# Patient Record
Sex: FEMALE | Race: WHITE | Employment: FULL TIME | URBAN - NONMETROPOLITAN AREA
[De-identification: names, ages, dates, MRNs, and addresses within clinical notes are randomized per-mention and may not be internally consistent; named-entity substitution may affect disease eponyms.]

---

## 2020-08-20 ENCOUNTER — OFFICE VISIT (OUTPATIENT)
Dept: SURGERY | Age: 30
End: 2020-08-20
Payer: COMMERCIAL

## 2020-08-20 VITALS — DIASTOLIC BLOOD PRESSURE: 85 MMHG | OXYGEN SATURATION: 97 % | HEART RATE: 104 BPM | SYSTOLIC BLOOD PRESSURE: 147 MMHG

## 2020-08-20 PROCEDURE — 99203 OFFICE O/P NEW LOW 30 MIN: CPT | Performed by: SURGERY

## 2020-08-20 RX ORDER — IBUPROFEN 800 MG/1
800 TABLET ORAL EVERY 8 HOURS PRN
COMMUNITY

## 2020-08-20 RX ORDER — ACETAMINOPHEN 650 MG/1
650 SUPPOSITORY RECTAL EVERY 4 HOURS PRN
COMMUNITY

## 2020-08-20 NOTE — LETTER
[address] [facility] Surg  [address]  Phone: [phone]  Fax: [phone]      20    Patient: Dayan Kim  MRN: [id number]  : 1990  Date of visit: 2020    Dear Dr Natasha Harvey: Thank you for the request for consultation for Cheryl Cardozo to me for the evaluation of RUQ pain. Below are the relevant portions of my assessment and plan of care. If you have questions, please do not hesitate to call me. I look forward to following Cheryl Cardozo along with you.     Sincerely,        Roseanne Molina, DO

## 2020-08-20 NOTE — PROGRESS NOTES
needed for Pain       No current facility-administered medications for this visit. Home Medications:   Prior to Admission medications    Medication Sig Start Date End Date Taking? Authorizing Provider   acetaminophen (TYLENOL) 650 MG suppository Place 650 mg rectally every 4 hours as needed for Fever   Yes Historical Provider, MD   ibuprofen (ADVIL;MOTRIN) 800 MG tablet Take 800 mg by mouth every 8 hours as needed for Pain   Yes Historical Provider, MD       Allergies  Latex      Review of Systems:  General: Denies any fever, chills. Eyes: Denies any changes in vision, diplopia or eye pain  Ears, Nose, Mouth: Denies changes in hearing/tinnitus or drainage from ears, no rhinorrhea or bloody nose, no difficulty chewing  Throat: no difficulty swallowing, no throat pain  Respiratory: Denies any shortness of breath or cough. Cardiac: Denies any chest pain, palpitations, claudication or edema. Gastrointestinal: RUQ pain Denies any melena, hematochezia, hematemesis or pyrosis. Genitourinary: Denies any frequency, urgency, hesitancy or incontinence. Musculoskeletal: Denies worsening muscle weakness or recent trauma  Skin: Denies rashes or lesions  Psychiatric: Denies any recent changes in mood or affect  Hematologic: Denies bruising or bleeding easily. PHYSICAL EXAMINATION  Vitals:   Vitals:    08/20/20 1112   BP: (!) 147/85   Pulse: 104   SpO2: 97%       General Appearance:  awake, alert, no acute distress, well developed, well nourished   Skin:  Skin color, texture, turgor normal. No rashes or lesions. Head/face:  NCAT, face symmetrical  Eyes:  PERRL, no evidence of conjunctivitis or ptosis bilaterally  Ears:  External ears and canals grossly normal, no evidence of otorrhea. Nose/Sinuses:  Nares normal. Septum midline. Mucosa normal. No external drainage noted. Mouth/Neck:  Mucosa moist.  No external oral lesions. Trachea midline. No visible masses.    Lungs:  Normal chest expansion, unlabored breathing

## 2020-09-11 LAB
ANION GAP SERPL CALCULATED.3IONS-SCNC: 9.1 MMOL/L
BUN BLDV-MCNC: 13 MG/DL (ref 7–17)
CALCIUM SERPL-MCNC: 9.7 MG/DL (ref 8.4–10.2)
CHLORIDE BLD-SCNC: 103 MMOL/L (ref 98–120)
CO2: 28 MMOL/L (ref 22–31)
CREAT SERPL-MCNC: 0.9 MG/DL (ref 0.5–1)
GFR CALCULATED: > 60
GLUCOSE: 111 MG/DL (ref 65–105)
HCT VFR BLD CALC: 43.6 % (ref 37–47)
HEMOGLOBIN: 14.8 (ref 12–16)
MCH RBC QN AUTO: 31.2 PG (ref 28.5–32.5)
MCHC RBC AUTO-ENTMCNC: 33.8 G/DL (ref 32–37)
MCV RBC AUTO: 92.2 FL (ref 80–94)
PDW BLD-RTO: 10.3 % (ref 8.5–15.5)
PLATELET # BLD: 232.7 THOU/MM3 (ref 130–400)
POTASSIUM SERPL-SCNC: 4.3 MMOL/L (ref 3.6–5)
RBC: 4.73 M/UL (ref 4.2–5.4)
SODIUM BLD-SCNC: 139 MMOL/L (ref 135–145)
WBC: 5.6 THOU/ML3 (ref 4.8–10.8)

## 2020-09-21 LAB
ANION GAP SERPL CALCULATED.3IONS-SCNC: 11.7 MMOL/L
BUN BLDV-MCNC: 10 MG/DL (ref 7–17)
CALCIUM SERPL-MCNC: 9.4 MG/DL (ref 8.4–10.2)
CHLORIDE BLD-SCNC: 103 MMOL/L (ref 98–120)
CO2: 23 MMOL/L (ref 22–31)
CREAT SERPL-MCNC: 0.9 MG/DL (ref 0.5–1)
GFR CALCULATED: > 60
GLUCOSE: 122 MG/DL (ref 65–105)
HCT VFR BLD CALC: 44.4 % (ref 37–47)
HEMOGLOBIN: 15.2 (ref 12–16)
MCH RBC QN AUTO: 31.2 PG (ref 28.5–32.5)
MCHC RBC AUTO-ENTMCNC: 34.3 G/DL (ref 32–37)
MCV RBC AUTO: 91.2 FL (ref 80–94)
PDW BLD-RTO: 10 % (ref 8.5–15.5)
PLATELET # BLD: 233.9 THOU/MM3 (ref 130–400)
POTASSIUM SERPL-SCNC: 4 MMOL/L (ref 3.6–5)
RBC: 4.87 M/UL (ref 4.2–5.4)
SODIUM BLD-SCNC: 139 MMOL/L (ref 135–145)
WBC: 5.4 THOU/ML3 (ref 4.8–10.8)

## 2020-09-25 ENCOUNTER — HOSPITAL ENCOUNTER (EMERGENCY)
Age: 30
Discharge: HOME | End: 2020-09-25
Attending: EMERGENCY MEDICINE
Payer: COMMERCIAL

## 2020-09-25 ENCOUNTER — APPOINTMENT (OUTPATIENT)
Dept: CT IMAGING | Age: 30
End: 2020-09-25
Payer: COMMERCIAL

## 2020-09-25 VITALS
RESPIRATION RATE: 14 BRPM | DIASTOLIC BLOOD PRESSURE: 85 MMHG | HEART RATE: 80 BPM | WEIGHT: 120 LBS | SYSTOLIC BLOOD PRESSURE: 115 MMHG | OXYGEN SATURATION: 100 % | TEMPERATURE: 97.7 F

## 2020-09-25 LAB
ABSOLUTE EOS #: <0.03 K/UL (ref 0–0.44)
ABSOLUTE IMMATURE GRANULOCYTE: 0.05 K/UL (ref 0–0.3)
ABSOLUTE LYMPH #: 1.03 K/UL (ref 1.1–3.7)
ABSOLUTE MONO #: 0.78 K/UL (ref 0.1–1.2)
ALBUMIN SERPL-MCNC: 4.6 G/DL (ref 3.5–5.2)
ALBUMIN/GLOBULIN RATIO: 1.7 (ref 1–2.5)
ALP BLD-CCNC: 66 U/L (ref 35–104)
ALT SERPL-CCNC: 218 U/L (ref 5–33)
ANION GAP SERPL CALCULATED.3IONS-SCNC: 12 MMOL/L (ref 9–17)
AST SERPL-CCNC: 224 U/L
BASOPHILS # BLD: 0 % (ref 0–2)
BASOPHILS ABSOLUTE: 0.03 K/UL (ref 0–0.2)
BILIRUB SERPL-MCNC: 0.83 MG/DL (ref 0.3–1.2)
BILIRUBIN DIRECT: 0.17 MG/DL
BILIRUBIN, INDIRECT: 0.66 MG/DL (ref 0–1)
BUN BLDV-MCNC: 9 MG/DL (ref 6–20)
BUN/CREAT BLD: 13 (ref 9–20)
CALCIUM SERPL-MCNC: 9.4 MG/DL (ref 8.6–10.4)
CHLORIDE BLD-SCNC: 102 MMOL/L (ref 98–107)
CO2: 21 MMOL/L (ref 20–31)
CREAT SERPL-MCNC: 0.71 MG/DL (ref 0.5–0.9)
DIFFERENTIAL TYPE: ABNORMAL
EOSINOPHILS RELATIVE PERCENT: 0 % (ref 1–4)
GFR AFRICAN AMERICAN: >60 ML/MIN
GFR NON-AFRICAN AMERICAN: >60 ML/MIN
GFR SERPL CREATININE-BSD FRML MDRD: ABNORMAL ML/MIN/{1.73_M2}
GFR SERPL CREATININE-BSD FRML MDRD: ABNORMAL ML/MIN/{1.73_M2}
GLOBULIN: 2.7 G/DL (ref 1.5–3.8)
GLUCOSE BLD-MCNC: 127 MG/DL (ref 70–99)
HCG QUALITATIVE: NEGATIVE
HCG URINE: NEGATIVE
HCT VFR BLD CALC: 39.1 % (ref 36.3–47.1)
HEMOGLOBIN: 13.3 G/DL (ref 11.9–15.1)
IMMATURE GRANULOCYTES: 0 %
LIPASE: 20 U/L (ref 13–60)
LYMPHOCYTES # BLD: 7 % (ref 24–43)
MCH RBC QN AUTO: 31.1 PG (ref 25.2–33.5)
MCHC RBC AUTO-ENTMCNC: 34 G/DL (ref 25.2–33.5)
MCV RBC AUTO: 91.6 FL (ref 82.6–102.9)
MONOCYTES # BLD: 6 % (ref 3–12)
NRBC AUTOMATED: 0 PER 100 WBC
PDW BLD-RTO: 12.1 % (ref 11.8–14.4)
PLATELET # BLD: 251 K/UL (ref 138–453)
PLATELET ESTIMATE: ABNORMAL
PMV BLD AUTO: 9.9 FL (ref 8.1–13.5)
POTASSIUM SERPL-SCNC: 4 MMOL/L (ref 3.7–5.3)
RBC # BLD: 4.27 M/UL (ref 3.95–5.11)
RBC # BLD: ABNORMAL 10*6/UL
SEG NEUTROPHILS: 87 % (ref 36–65)
SEGMENTED NEUTROPHILS ABSOLUTE COUNT: 11.94 K/UL (ref 1.5–8.1)
SODIUM BLD-SCNC: 135 MMOL/L (ref 135–144)
TOTAL PROTEIN: 7.3 G/DL (ref 6.4–8.3)
WBC # BLD: 13.8 K/UL (ref 3.5–11.3)
WBC # BLD: ABNORMAL 10*3/UL

## 2020-09-25 PROCEDURE — 96375 TX/PRO/DX INJ NEW DRUG ADDON: CPT

## 2020-09-25 PROCEDURE — 96374 THER/PROPH/DIAG INJ IV PUSH: CPT

## 2020-09-25 PROCEDURE — 6360000002 HC RX W HCPCS: Performed by: EMERGENCY MEDICINE

## 2020-09-25 PROCEDURE — 2580000003 HC RX 258: Performed by: EMERGENCY MEDICINE

## 2020-09-25 PROCEDURE — 80076 HEPATIC FUNCTION PANEL: CPT

## 2020-09-25 PROCEDURE — 6360000004 HC RX CONTRAST MEDICATION: Performed by: EMERGENCY MEDICINE

## 2020-09-25 PROCEDURE — 83690 ASSAY OF LIPASE: CPT

## 2020-09-25 PROCEDURE — 2709999900 CT ABDOMEN PELVIS W IV CONTRAST

## 2020-09-25 PROCEDURE — 36415 COLL VENOUS BLD VENIPUNCTURE: CPT

## 2020-09-25 PROCEDURE — 84703 CHORIONIC GONADOTROPIN ASSAY: CPT

## 2020-09-25 PROCEDURE — 80048 BASIC METABOLIC PNL TOTAL CA: CPT

## 2020-09-25 PROCEDURE — 99285 EMERGENCY DEPT VISIT HI MDM: CPT

## 2020-09-25 PROCEDURE — 85025 COMPLETE CBC W/AUTO DIFF WBC: CPT

## 2020-09-25 RX ORDER — KETOROLAC TROMETHAMINE 30 MG/ML
30 INJECTION, SOLUTION INTRAMUSCULAR; INTRAVENOUS ONCE
Status: COMPLETED | OUTPATIENT
Start: 2020-09-25 | End: 2020-09-25

## 2020-09-25 RX ORDER — KETOROLAC TROMETHAMINE 10 MG/1
10 TABLET, FILM COATED ORAL EVERY 6 HOURS PRN
Qty: 12 TABLET | Refills: 0 | Status: ON HOLD | OUTPATIENT
Start: 2020-09-25 | End: 2021-05-02 | Stop reason: HOSPADM

## 2020-09-25 RX ORDER — ONDANSETRON 4 MG/1
4 TABLET, ORALLY DISINTEGRATING ORAL EVERY 8 HOURS PRN
Qty: 10 TABLET | Refills: 0 | Status: SHIPPED | OUTPATIENT
Start: 2020-09-25

## 2020-09-25 RX ORDER — PROMETHAZINE HYDROCHLORIDE 25 MG/1
25 SUPPOSITORY RECTAL EVERY 6 HOURS PRN
Qty: 10 SUPPOSITORY | Refills: 0 | Status: SHIPPED | OUTPATIENT
Start: 2020-09-25 | End: 2020-10-02

## 2020-09-25 RX ORDER — 0.9 % SODIUM CHLORIDE 0.9 %
1000 INTRAVENOUS SOLUTION INTRAVENOUS ONCE
Status: COMPLETED | OUTPATIENT
Start: 2020-09-25 | End: 2020-09-25

## 2020-09-25 RX ORDER — ONDANSETRON 2 MG/ML
4 INJECTION INTRAMUSCULAR; INTRAVENOUS ONCE
Status: COMPLETED | OUTPATIENT
Start: 2020-09-25 | End: 2020-09-25

## 2020-09-25 RX ADMIN — ONDANSETRON 4 MG: 2 INJECTION INTRAMUSCULAR; INTRAVENOUS at 22:08

## 2020-09-25 RX ADMIN — SODIUM CHLORIDE 1000 ML: 9 INJECTION, SOLUTION INTRAVENOUS at 22:07

## 2020-09-25 RX ADMIN — KETOROLAC TROMETHAMINE 30 MG: 30 INJECTION, SOLUTION INTRAMUSCULAR at 22:09

## 2020-09-25 RX ADMIN — IOPAMIDOL 100 ML: 755 INJECTION, SOLUTION INTRAVENOUS at 22:46

## 2020-09-25 ASSESSMENT — PAIN DESCRIPTION - PAIN TYPE
TYPE: ACUTE PAIN
TYPE: ACUTE PAIN

## 2020-09-25 ASSESSMENT — PAIN DESCRIPTION - FREQUENCY
FREQUENCY: CONTINUOUS
FREQUENCY: CONTINUOUS

## 2020-09-25 ASSESSMENT — PAIN DESCRIPTION - LOCATION
LOCATION: ABDOMEN
LOCATION: ABDOMEN

## 2020-09-25 ASSESSMENT — PAIN SCALES - GENERAL
PAINLEVEL_OUTOF10: 10
PAINLEVEL_OUTOF10: 4
PAINLEVEL_OUTOF10: 10

## 2020-09-25 ASSESSMENT — PAIN DESCRIPTION - DESCRIPTORS: DESCRIPTORS: BURNING

## 2020-09-26 NOTE — ED TRIAGE NOTES
Patient presents to the ED with complaints of abdominal pain and emesis. Patient had her gallbladder removed earlier today per Dr. Anika Smith. She states about an hour after she got home she started throwing up despite taking Zofran. Patient has been taking 800 mg of motrin for pain. She states she is a recovering addict and does not want anything stronger for pain. No other questions or concerns at this time. Incisions intact. No signs of infections. Call light within reach.

## 2020-09-26 NOTE — ED PROVIDER NOTES
[address] [facility] ED  Osmin Fisher [address] [place]  Phone: [phone]      Pt Name: More Lau  TNU:[id number]  Baileygfpetey 1990  Date of evaluation: 9/25/2020      CHIEF COMPLAINT       Chief Complaint   Patient presents with    Post-op Problem     pain, green emisis        HISTORY OF PRESENT ILLNESS   More Lau is a 26 y.o. female who presents for evaluation of abdominal pain following a laparoscopic cholecystectomy. The patient reports that this morning she had a laparoscopic cholecystectomy at [facility] by Dr. Eloisa Adamson. She states that following the procedure her abdominal pain was manageable and she was discharged home. She states that starting around 2 PM this afternoon she developed gradual onset, constant, progressive, sharp, stabbing, throbbing, burning, nonradiating, epigastric abdominal pain with associated nausea and multiple episodes of nonbloody emesis. She also complains of feeling generally weak. She tried taking Zofran and ibuprofen without improvement. She does not list any other provoking or palliating factors. The patient has been passing flatus and states that she has had a bowel movement since surgery which was normal.  She is a recovered opioid addict and refuses any narcotics. She states that besides cholecystectomy the only other abdominal surgery she has had are hernia repair, tubal ligation and exploratory laparoscopy. The patient denies fever, chills, headache, vision changes, neck pain, back pain, chest pain, shortness of breath, urinary/bowel symptoms, vaginal bleeding, vaginal discharge, focal weakness, numbness, tingling, recent illness or any abdominal trauma since surgery.     REVIEW OF SYSTEMS     Ten point review of systems was reviewed and is negative unless otherwise noted in the HPI    [address]    has a past medical history of Abdominal bloating, Anxiety, Bacterial vaginitis, Chest pain, Colicky right upper quadrant pain, Dysmenorrhea, GERD (gastroesophageal reflux disease), H/O chlamydia infection, Nausea, Opiate addiction ([address] Osteoarthritis, and Pleurisy. SURGICAL HISTORY      has a past surgical history that includes laparoscopy and Tubal ligation. CURRENT MEDICATIONS       Previous Medications    ACETAMINOPHEN (TYLENOL) 650 MG SUPPOSITORY    Place 650 mg rectally every 4 hours as needed for Fever    IBUPROFEN (ADVIL;MOTRIN) 800 MG TABLET    Take 800 mg by mouth every 8 hours as needed for Pain       ALLERGIES     is allergic to latex. FAMILY HISTORY     She indicated that the status of her mother is unknown. She indicated that the status of her father is unknown. She indicated that the status of her other is unknown.     family history includes Cancer in an other family member; Coronary Art Dis in her father; High Blood Pressure in her father and mother; High Cholesterol in her father and mother; Other in her mother. SOCIAL HISTORY      reports that she has been smoking. She has never used smokeless tobacco.  I counseled the patient against using tobacco products. PHYSICAL EXAM     INITIAL VITALS:  weight is 120 lb (54.4 kg). Her tympanic temperature is 97.7 °F (36.5 °C). Her blood pressure is 115/85 and her pulse is 80. Her respiration is 14 and oxygen saturation is 100%. CONSTITUTIONAL: Appears uncomfortable, nontoxic  SKIN: warm, dry, no jaundice, hives or petechiae  EYES: clear conjunctiva, non-icteric sclera  HENT: normocephalic, atraumatic, dry mucus membranes  NECK: Nontender and supple with no nuchal rigidity, full range of motion  PULMONARY: clear to auscultation without wheezes, rhonchi, or rales, normal excursion, no accessory muscle use and no stridor  CARDIOVASCULAR: regular rate, rhythm. Strong radial pulses with intact distal perfusion. Capillary refill <2 seconds.   GASTROINTESTINAL: There are laparoscopic, well-healing, surgical incisions across abdomen that are closed with glue and do not show any signs of infection or dehiscence. Soft, epigastric tenderness to palpation, mild tenderness to the right upper quadrant with positive Jennings sign, no pain or McBurney's point, non-distended, no palpable masses, no rebound or guarding   GENITOURINARY: No costovertebral angle tenderness to palpation  MUSCULOSKELETAL: No midline spinal tenderness, step off or deformity. Extremities are otherwise nontender to palpation and nonerythematous. Compartments soft. No peripheral edema. NEUROLOGIC: alert and oriented x 3, GCS 15, normal mentation and speech. Moves all extremities x 4 without motor or sensory deficit, gait is stable without ataxia  PSYCHIATRIC: normal mood and affect, thought process is clear and linear    DIAGNOSTIC RESULTS     EKG:  None    RADIOLOGY:   Ct Abdomen Pelvis W Iv Contrast Additional Contrast? None    Result Date: 9/25/2020  EXAMINATION: CT OF THE ABDOMEN AND PELVIS WITH CONTRAST 9/25/2020 10:34 pm TECHNIQUE: CT of the abdomen and pelvis was performed with the administration of intravenous contrast. Multiplanar reformatted images are provided for review. Dose modulation, iterative reconstruction, and/or weight based adjustment of the mA/kV was utilized to reduce the radiation dose to as low as reasonably achievable. COMPARISON: None. HISTORY: ORDERING SYSTEM PROVIDED HISTORY: epigastric pain following lap gail this morning with N/V TECHNOLOGIST PROVIDED HISTORY: epigastric pain following lap gail this morning with N/V Reason for Exam: epigastric pain following lap gail this morning with nausea and vomiting Acuity: Acute Type of Exam: Initial FINDINGS: Lower Chest: Lung bases are clear without pulmonary nodules, masses, effusions, or foci of airspace disease. The base of the heart is normal in size without pericardial fluid collection. Organs: Status post cholecystectomy. Subtle pneumobilia noted and there is periportal edema.   No abnormal fluid collection identified within the %    Monocytes 6 3 - 12 %    Eosinophils % 0 (L) 1 - 4 %    Basophils 0 0 - 2 %    Immature Granulocytes 0 0 %    Segs Absolute 11.94 (H) 1.50 - 8.10 k/uL    Absolute Lymph # 1.03 (L) 1.10 - 3.70 k/uL    Absolute Mono # 0.78 0.10 - 1.20 k/uL    Absolute Eos # <0.03 0.00 - 0.44 k/uL    Basophils Absolute 0.03 0.00 - 0.20 k/uL    Absolute Immature Granulocyte 0.05 0.00 - 0.30 k/uL    WBC Morphology NOT REPORTED     RBC Morphology NOT REPORTED     Platelet Estimate NOT REPORTED    Basic Metabolic Panel w/ Reflex to MG   Result Value Ref Range    Glucose 127 (H) 70 - 99 mg/dL    BUN 9 6 - 20 mg/dL    CREATININE 0.71 0.50 - 0.90 mg/dL    Bun/Cre Ratio 13 9 - 20    Calcium 9.4 8.6 - 10.4 mg/dL    Sodium 135 135 - 144 mmol/L    Potassium 4.0 3.7 - 5.3 mmol/L    Chloride 102 98 - 107 mmol/L    CO2 21 20 - 31 mmol/L    Anion Gap 12 9 - 17 mmol/L    GFR Non-African American >60 >60 mL/min    GFR African American >60 >60 mL/min    GFR Comment          GFR Staging NOT REPORTED    Hepatic Function Panel   Result Value Ref Range    Alb 4.6 3.5 - 5.2 g/dL    Alkaline Phosphatase 66 35 - 104 U/L     (H) 5 - 33 U/L     (H) <32 U/L    Total Bilirubin 0.83 0.3 - 1.2 mg/dL    Bilirubin, Direct 0.17 <0.31 mg/dL    Bilirubin, Indirect 0.66 0.00 - 1.00 mg/dL    Total Protein 7.3 6.4 - 8.3 g/dL    Globulin 2.7 1.5 - 3.8 g/dL    Albumin/Globulin Ratio 1.7 1.0 - 2.5   Lipase   Result Value Ref Range    Lipase 20 13 - 60 U/L   HCG Qualitative, Serum   Result Value Ref Range    hCG Qual NEGATIVE NEGATIVE       EMERGENCY DEPARTMENT COURSE:     ED Course as of Sep 25 2328   Fri Sep 25, 2020   2310 I spoke to general surgery on call for Dr. Nakul Heaton, Dr. Hans Whitmore, and we reviewed the patient's results. We discussed that she is feeling improved after Toradol, Zofran and IV fluids.  Dr. Hans Whitmore suspects her symptoms are secondary to pos     [AM]      ED Course User Index  [AM] Sherron Wilson, DO       The patient was given the following medications:  Orders Placed This Encounter   Medications    0.9 % sodium chloride bolus    ketorolac (TORADOL) injection 30 mg    ondansetron (ZOFRAN) injection 4 mg    iopamidol (ISOVUE-370) 76 % injection 100 mL    ondansetron (ZOFRAN ODT) 4 MG disintegrating tablet     Sig: Take 1 tablet by mouth every 8 hours as needed for Nausea     Dispense:  10 tablet     Refill:  0    ketorolac (TORADOL) 10 MG tablet     Sig: Take 1 tablet by mouth every 6 hours as needed for Pain     Dispense:  12 tablet     Refill:  0    promethazine (PHENERGAN) 25 MG suppository     Sig: Place 1 suppository rectally every 6 hours as needed for Nausea WARNING:  May cause drowsiness. May impair ability to operate vehicles or machinery. Do not use in combination with alcohol. Dispense:  10 suppository     Refill:  0        Vitals:    Vitals:    09/25/20 2153 09/25/20 2256   BP: (!) 142/88 115/85   Pulse: 74 80   Resp: 14 14   Temp: 97.7 °F (36.5 °C)    TempSrc: Tympanic    SpO2: 97% 100%   Weight: 120 lb (54.4 kg)      -------------------------  BP: 115/85, Temp: 97.7 °F (36.5 °C), Pulse: 80, Resp: 14    CONSULTS:  None    CRITICAL CARE:   None    PROCEDURES:  None    DIAGNOSIS/ MDM:   More Lau is a 26 y.o. female who presents with abdominal pain, nausea and vomiting status post laparoscopic cholecystectomy this morning. Vital signs are stable. Heart regular rate and rhythm. Lungs clear to auscultation. Abdomen soft with epigastric tenderness to palpation. Surgical incisions appear to be intact and healing well. The patient was given IV fluids, Toradol and Zofran with significant improvement in her symptoms. Abdomen soft and minimally tender on repeat exam.  CBC shows a slight leukocytosis of 13.8 which I suspect is reactive secondary to her surgery this morning. CBC is otherwise unremarkable. BMP and lipase are unremarkable.   LFTs show elevated ALT of 218 and AST of 224 which is consistent with her surgery today.  CT abdomen pelvis shows postoperative changes without any abnormal fluid collection. I suspect the patient's pain is secondary to postoperative pain and she has not taken much for pain at home. I have low suspicion for acute abdomen, perforation, obstruction or sepsis. I spoke with the general surgeon on-call, Dr. Kiara Monge, who agrees with plan to discharge the patient home. He recommends prescribing her Toradol p.o. for pain. We also discussed prescribing her Zofran ODT and Phenergan suppositories to help control nausea. The patient was instructed to continue to hydrate at home with water and electrolyte rich fluids. She was instructed to follow-up with her surgeon next week as scheduled and to return to the ED for worsening symptoms or any other concern. The patient understands that at this time there is no evidence for a more malignant underlying process, but also understands that early in the process of an illness or injury, and emergency department work-up can be falsely reassuring. Routine discharge counseling was given, and the patient understands that worsening, changing or persistent symptoms should prompt a immediate call or follow-up with their primary care physician or return to the emergency department. The importance of appropriate follow-up was also discussed. I have reviewed the disposition diagnosis with the patient. I have answered their questions and given discharge instructions. They voiced understanding of these instructions and did not have any further questions or complaints. FINAL IMPRESSION      1. Post-operative pain    2. S/P laparoscopic cholecystectomy          DISPOSITION/PLAN   DISPOSITION          PATIENT REFERRED TO:  Benny Blackwell MD  [address]    Schedule an appointment as soon as possible for a visit in 3 days      Rome Coffey DO  [facility]  [phone]    Schedule an appointment as soon as possible for a visit in 3 days      [facility]  [address]  [phone]  Go to   If symptoms worsen      DISCHARGE MEDICATIONS:  New Prescriptions    KETOROLAC (TORADOL) 10 MG TABLET    Take 1 tablet by mouth every 6 hours as needed for Pain    ONDANSETRON (ZOFRAN ODT) 4 MG DISINTEGRATING TABLET    Take 1 tablet by mouth every 8 hours as needed for Nausea    PROMETHAZINE (PHENERGAN) 25 MG SUPPOSITORY    Place 1 suppository rectally every 6 hours as needed for Nausea WARNING:  May cause drowsiness. May impair ability to operate vehicles or machinery. Do not use in combination with alcohol.        (Please note that portions of this note were completed with a voice recognitionprogram.  Efforts were made to edit the dictations but occasionally words are mis-transcribed.)    Michael Grajeda DO  Emergency Physician Attending          Michael Grajeda DO  09/25/20 8074

## 2020-10-01 ENCOUNTER — OFFICE VISIT (OUTPATIENT)
Dept: SURGERY | Age: 30
End: 2020-10-01

## 2020-10-01 VITALS
TEMPERATURE: 99.3 F | DIASTOLIC BLOOD PRESSURE: 66 MMHG | SYSTOLIC BLOOD PRESSURE: 118 MMHG | BODY MASS INDEX: 19.23 KG/M2 | HEIGHT: 62 IN | WEIGHT: 104.5 LBS | HEART RATE: 74 BPM

## 2020-10-01 PROCEDURE — 99024 POSTOP FOLLOW-UP VISIT: CPT | Performed by: SURGERY

## 2020-10-01 NOTE — LETTER
[address]  Phone: [phone]  Fax: [phone]      10/1/20    Patient: Bessy Chapa  MRN: [id number]  : 1990  Date of visit: 10/1/2020    Dear Dr Sheila De La Rosa:     I saw Bessy Chapa in follow up to her lap cholecystectomy, she is doing well. Below are the relevant portions of my assessment and plan of care. If you have questions, please do not hesitate to call me. I look forward to following Bessy Chapa along with you.     Sincerely,        Mia Handy, DO

## 2020-10-01 NOTE — PROGRESS NOTES
[facility]  Progress Note    PATIENT NAME: [address] VISIT DATE: 10/1/2020    SUBJECTIVE:  Raz Carrington is a 26 y.o. female who presents to the clinic today for follow up to lap gail performed 9/25/20      OBJECTIVE:    /66   Pulse 74   Temp 99.3 °F (37.4 °C) (Tympanic)   Ht 5' 2\" (1.575 m)   Wt 104 lb 8 oz (47.4 kg)   BMI 19.11 kg/m²     General Appearance:  awake, alert, no acute distress, well developed, well nourished   Skin:  Skin color, texture, turgor normal. No rashes or lesions. Lungs:  Normal chest expansion, unlabored breathing without accessory muscle use. No audible rales, rhonchi, or wheezing. Cardiovascular: S1S2. No evidence of JVD. No evidence of pulsatile masses in abdomen  Abdomen:  Soft, non-tender, incisions well healed without evidence of bleeding/infection, resolving ecchymosis right abdomen port site. Skin glue is intact. Musculoskeletal: No evidence of bony/muscular deformities, trauma, atrophy of either left/right upper/lower extremity. No evidence of digital clubbing or cyanosis. ASSESSMENT:   Diagnosis Orders   1. Status post laparoscopic cholecystectomy         PLAN:  1. Diet advance as tolerated  2. Lifting restriction to less than 20lbs for the next 4 weeks, unrestricted after this. 3. Pt can remove skin glue after a few days if still intact  4.  F/u prn    Electronically signed by Idalia Ordoñez DO on 10/1/2020 at 9:57 AM

## 2020-10-22 ENCOUNTER — TELEPHONE (OUTPATIENT)
Dept: SURGERY | Age: 30
End: 2020-10-22

## 2020-10-22 NOTE — TELEPHONE ENCOUNTER
Patient phoned facility requesting a letter for work. Patient had a Lap/Jihan performed 09/25/2020. 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

## 2020-10-23 NOTE — TELEPHONE ENCOUNTER
She will be released from all lifting restrictions 10/30/2020, she is allowed to work as long as she maintains lifting/pushing/pulling to less than 20lbs.

## 2021-04-08 ENCOUNTER — INITIAL CONSULT (OUTPATIENT)
Dept: BARIATRICS/WEIGHT MGMT | Age: 31
End: 2021-04-08
Payer: COMMERCIAL

## 2021-04-08 VITALS
WEIGHT: 93 LBS | DIASTOLIC BLOOD PRESSURE: 72 MMHG | SYSTOLIC BLOOD PRESSURE: 116 MMHG | HEART RATE: 87 BPM | BODY MASS INDEX: 17.11 KG/M2 | HEIGHT: 62 IN

## 2021-04-08 DIAGNOSIS — K55.1: Primary | ICD-10-CM

## 2021-04-08 PROCEDURE — 99203 OFFICE O/P NEW LOW 30 MIN: CPT | Performed by: SURGERY

## 2021-04-08 RX ORDER — CYCLOBENZAPRINE HCL 10 MG
10 TABLET ORAL 3 TIMES DAILY PRN
COMMUNITY
End: 2021-04-29

## 2021-04-08 RX ORDER — PANTOPRAZOLE SODIUM 40 MG/1
40 TABLET, DELAYED RELEASE ORAL 2 TIMES DAILY
COMMUNITY

## 2021-04-08 RX ORDER — BUSPIRONE HYDROCHLORIDE 10 MG/1
10 TABLET ORAL 2 TIMES DAILY
COMMUNITY

## 2021-04-14 ENCOUNTER — HOSPITAL ENCOUNTER (OUTPATIENT)
Dept: LAB | Age: 31
Discharge: HOME | End: 2021-04-14
Payer: COMMERCIAL

## 2021-04-14 ENCOUNTER — HOSPITAL ENCOUNTER (OUTPATIENT)
Dept: CT IMAGING | Age: 31
Discharge: HOME | End: 2021-04-16
Payer: COMMERCIAL

## 2021-04-14 DIAGNOSIS — K55.1: ICD-10-CM

## 2021-04-14 LAB
ALBUMIN SERPL-MCNC: 4.9 G/DL (ref 3.5–5.2)
ALBUMIN/GLOBULIN RATIO: 2 (ref 1–2.5)
ALP BLD-CCNC: 52 U/L (ref 35–104)
ALT SERPL-CCNC: 11 U/L (ref 5–33)
ANION GAP SERPL CALCULATED.3IONS-SCNC: 11 MMOL/L (ref 9–17)
AST SERPL-CCNC: 17 U/L
BILIRUB SERPL-MCNC: 0.64 MG/DL (ref 0.3–1.2)
BUN BLDV-MCNC: 15 MG/DL (ref 6–20)
BUN/CREAT BLD: 21 (ref 9–20)
CALCIUM SERPL-MCNC: 9.5 MG/DL (ref 8.6–10.4)
CHLORIDE BLD-SCNC: 97 MMOL/L (ref 98–107)
CO2: 26 MMOL/L (ref 20–31)
CREAT SERPL-MCNC: 0.7 MG/DL (ref 0.5–0.9)
GFR AFRICAN AMERICAN: >60 ML/MIN
GFR NON-AFRICAN AMERICAN: >60 ML/MIN
GFR SERPL CREATININE-BSD FRML MDRD: ABNORMAL ML/MIN/{1.73_M2}
GFR SERPL CREATININE-BSD FRML MDRD: ABNORMAL ML/MIN/{1.73_M2}
GLUCOSE BLD-MCNC: 90 MG/DL (ref 70–99)
POTASSIUM SERPL-SCNC: 4 MMOL/L (ref 3.7–5.3)
PREALBUMIN: 25 MG/DL (ref 20–40)
SODIUM BLD-SCNC: 134 MMOL/L (ref 135–144)
TOTAL PROTEIN: 7.4 G/DL (ref 6.4–8.3)
TSH SERPL DL<=0.05 MIU/L-ACNC: 3.76 MIU/L (ref 0.3–5)

## 2021-04-14 PROCEDURE — 36415 COLL VENOUS BLD VENIPUNCTURE: CPT

## 2021-04-14 PROCEDURE — 6360000004 HC RX CONTRAST MEDICATION: Performed by: SURGERY

## 2021-04-14 PROCEDURE — 84443 ASSAY THYROID STIM HORMONE: CPT

## 2021-04-14 PROCEDURE — 84134 ASSAY OF PREALBUMIN: CPT

## 2021-04-14 PROCEDURE — 80053 COMPREHEN METABOLIC PANEL: CPT

## 2021-04-14 PROCEDURE — 2709999900 CT ABDOMEN PELVIS W IV CONTRAST

## 2021-04-14 RX ADMIN — IOHEXOL 50 ML: 240 INJECTION, SOLUTION INTRATHECAL; INTRAVASCULAR; INTRAVENOUS; ORAL at 13:44

## 2021-04-14 RX ADMIN — IOPAMIDOL 100 ML: 755 INJECTION, SOLUTION INTRAVENOUS at 14:07

## 2021-04-29 ENCOUNTER — OFFICE VISIT (OUTPATIENT)
Dept: BARIATRICS/WEIGHT MGMT | Age: 31
End: 2021-04-29
Payer: COMMERCIAL

## 2021-04-29 VITALS
HEIGHT: 62 IN | HEART RATE: 69 BPM | DIASTOLIC BLOOD PRESSURE: 60 MMHG | BODY MASS INDEX: 17.11 KG/M2 | WEIGHT: 93 LBS | SYSTOLIC BLOOD PRESSURE: 92 MMHG

## 2021-04-29 DIAGNOSIS — K55.1: Primary | ICD-10-CM

## 2021-04-29 PROCEDURE — 99212 OFFICE O/P EST SF 10 MIN: CPT | Performed by: SURGERY

## 2021-04-29 RX ORDER — CYCLOBENZAPRINE HCL 5 MG
TABLET ORAL
COMMUNITY
Start: 2021-03-30

## 2021-04-30 ENCOUNTER — TELEPHONE (OUTPATIENT)
Dept: BARIATRICS/WEIGHT MGMT | Age: 31
End: 2021-04-30

## 2021-04-30 NOTE — LETTER
[facility]  [address]  [address] [facility]  Phone: [phone]  Fax: [phone] [id number] , DO        April 30, 2021     Patient: Socorro Nuñez   YOB: 1990   Date of Visit: 4/30/2021       To Whom it May Concern:    Wade Hill was seen in my clinic on 4/29/2021. She was instructed to go to the hospital on 4/30/2021 for further evaluation. If you have any questions or concerns, please don't hesitate to call.     Sincerely,         Desi Daily, DO

## 2021-04-30 NOTE — TELEPHONE ENCOUNTER
Patient seen yesterday 4-29-21, employer is requesting a letter to state she was sent to hospital and that she was seen yesterday in the office, please fax to [phone] [employer]

## 2021-05-01 ENCOUNTER — HOSPITAL ENCOUNTER (OUTPATIENT)
Age: 31
Setting detail: OBSERVATION
Discharge: HOME | End: 2021-05-02
Attending: EMERGENCY MEDICINE | Admitting: INTERNAL MEDICINE
Payer: COMMERCIAL

## 2021-05-01 DIAGNOSIS — K55.1 SUPERIOR MESENTERIC ARTERY SYNDROME (HCC): Primary | ICD-10-CM

## 2021-05-01 PROBLEM — R62.51 FAILURE TO THRIVE (0-17): Status: ACTIVE | Noted: 2021-05-01

## 2021-05-01 PROBLEM — R62.7 FAILURE TO THRIVE IN ADULT: Status: ACTIVE | Noted: 2021-05-01

## 2021-05-01 LAB
ABSOLUTE EOS #: 0.1 K/UL (ref 0–0.44)
ABSOLUTE IMMATURE GRANULOCYTE: <0.03 K/UL (ref 0–0.3)
ABSOLUTE LYMPH #: 1.92 K/UL (ref 1.1–3.7)
ABSOLUTE MONO #: 0.77 K/UL (ref 0.1–1.2)
ALBUMIN SERPL-MCNC: 4.8 G/DL (ref 3.5–5.2)
ALBUMIN/GLOBULIN RATIO: 1.7 (ref 1–2.5)
ALP BLD-CCNC: 52 U/L (ref 35–104)
ALT SERPL-CCNC: 15 U/L (ref 5–33)
ANION GAP SERPL CALCULATED.3IONS-SCNC: 13 MMOL/L (ref 9–17)
AST SERPL-CCNC: 18 U/L
BASOPHILS # BLD: 1 % (ref 0–2)
BASOPHILS ABSOLUTE: 0.06 K/UL (ref 0–0.2)
BETA-HYDROXYBUTYRATE: 0.16 MMOL/L (ref 0.02–0.27)
BILIRUB SERPL-MCNC: 0.42 MG/DL (ref 0.3–1.2)
BUN BLDV-MCNC: 14 MG/DL (ref 6–20)
BUN/CREAT BLD: NORMAL (ref 9–20)
CALCIUM SERPL-MCNC: 9 MG/DL (ref 8.6–10.4)
CHLORIDE BLD-SCNC: 101 MMOL/L (ref 98–107)
CO2: 22 MMOL/L (ref 20–31)
CREAT SERPL-MCNC: 0.63 MG/DL (ref 0.5–0.9)
DIFFERENTIAL TYPE: NORMAL
EOSINOPHILS RELATIVE PERCENT: 1 % (ref 1–4)
GFR AFRICAN AMERICAN: >60 ML/MIN
GFR NON-AFRICAN AMERICAN: >60 ML/MIN
GFR SERPL CREATININE-BSD FRML MDRD: NORMAL ML/MIN/{1.73_M2}
GFR SERPL CREATININE-BSD FRML MDRD: NORMAL ML/MIN/{1.73_M2}
GLUCOSE BLD-MCNC: 88 MG/DL (ref 70–99)
HCG QUALITATIVE: NEGATIVE
HCT VFR BLD CALC: 43.7 % (ref 36.3–47.1)
HEMOGLOBIN: 14.6 G/DL (ref 11.9–15.1)
IMMATURE GRANULOCYTES: 0 %
LACTIC ACID, SEPSIS WHOLE BLOOD: 1 MMOL/L (ref 0.5–1.9)
LACTIC ACID, SEPSIS: NORMAL MMOL/L (ref 0.5–1.9)
LYMPHOCYTES # BLD: 24 % (ref 24–43)
MCH RBC QN AUTO: 31.2 PG (ref 25.2–33.5)
MCHC RBC AUTO-ENTMCNC: 33.4 G/DL (ref 28.4–34.8)
MCV RBC AUTO: 93.4 FL (ref 82.6–102.9)
MONOCYTES # BLD: 10 % (ref 3–12)
NRBC AUTOMATED: 0 PER 100 WBC
PDW BLD-RTO: 12 % (ref 11.8–14.4)
PLATELET # BLD: 252 K/UL (ref 138–453)
PLATELET ESTIMATE: NORMAL
PMV BLD AUTO: 10.4 FL (ref 8.1–13.5)
POTASSIUM SERPL-SCNC: 3.7 MMOL/L (ref 3.7–5.3)
RBC # BLD: 4.68 M/UL (ref 3.95–5.11)
RBC # BLD: NORMAL 10*6/UL
SEG NEUTROPHILS: 64 % (ref 36–65)
SEGMENTED NEUTROPHILS ABSOLUTE COUNT: 5.18 K/UL (ref 1.5–8.1)
SODIUM BLD-SCNC: 136 MMOL/L (ref 135–144)
TOTAL PROTEIN: 7.7 G/DL (ref 6.4–8.3)
WBC # BLD: 8.1 K/UL (ref 3.5–11.3)
WBC # BLD: NORMAL 10*3/UL

## 2021-05-01 PROCEDURE — 99284 EMERGENCY DEPT VISIT MOD MDM: CPT

## 2021-05-01 PROCEDURE — 82010 KETONE BODYS QUAN: CPT

## 2021-05-01 PROCEDURE — 83605 ASSAY OF LACTIC ACID: CPT

## 2021-05-01 PROCEDURE — 2580000003 HC RX 258: Performed by: STUDENT IN AN ORGANIZED HEALTH CARE EDUCATION/TRAINING PROGRAM

## 2021-05-01 PROCEDURE — 84703 CHORIONIC GONADOTROPIN ASSAY: CPT

## 2021-05-01 PROCEDURE — 99219 PR INITIAL OBSERVATION CARE/DAY 50 MINUTES: CPT | Performed by: NURSE PRACTITIONER

## 2021-05-01 PROCEDURE — 80053 COMPREHEN METABOLIC PANEL: CPT

## 2021-05-01 PROCEDURE — G0378 HOSPITAL OBSERVATION PER HR: HCPCS

## 2021-05-01 PROCEDURE — 85025 COMPLETE CBC W/AUTO DIFF WBC: CPT

## 2021-05-01 RX ORDER — PANTOPRAZOLE SODIUM 40 MG/1
40 TABLET, DELAYED RELEASE ORAL 2 TIMES DAILY
Status: DISCONTINUED | OUTPATIENT
Start: 2021-05-02 | End: 2021-05-02 | Stop reason: HOSPADM

## 2021-05-01 RX ORDER — SODIUM CHLORIDE 0.9 % (FLUSH) 0.9 %
5-40 SYRINGE (ML) INJECTION EVERY 12 HOURS SCHEDULED
Status: DISCONTINUED | OUTPATIENT
Start: 2021-05-01 | End: 2021-05-02 | Stop reason: HOSPADM

## 2021-05-01 RX ORDER — PROMETHAZINE HYDROCHLORIDE 12.5 MG/1
12.5 TABLET ORAL EVERY 6 HOURS PRN
Status: DISCONTINUED | OUTPATIENT
Start: 2021-05-01 | End: 2021-05-02 | Stop reason: HOSPADM

## 2021-05-01 RX ORDER — SODIUM CHLORIDE 9 MG/ML
25 INJECTION, SOLUTION INTRAVENOUS PRN
Status: DISCONTINUED | OUTPATIENT
Start: 2021-05-01 | End: 2021-05-02 | Stop reason: HOSPADM

## 2021-05-01 RX ORDER — SODIUM CHLORIDE 0.9 % (FLUSH) 0.9 %
5-40 SYRINGE (ML) INJECTION PRN
Status: DISCONTINUED | OUTPATIENT
Start: 2021-05-01 | End: 2021-05-02 | Stop reason: HOSPADM

## 2021-05-01 RX ORDER — BUSPIRONE HYDROCHLORIDE 10 MG/1
10 TABLET ORAL 2 TIMES DAILY
Status: DISCONTINUED | OUTPATIENT
Start: 2021-05-02 | End: 2021-05-02 | Stop reason: HOSPADM

## 2021-05-01 RX ORDER — POLYETHYLENE GLYCOL 3350 17 G/17G
17 POWDER, FOR SOLUTION ORAL DAILY PRN
Status: DISCONTINUED | OUTPATIENT
Start: 2021-05-01 | End: 2021-05-02 | Stop reason: HOSPADM

## 2021-05-01 RX ORDER — NICOTINE 21 MG/24HR
1 PATCH, TRANSDERMAL 24 HOURS TRANSDERMAL DAILY PRN
Status: DISCONTINUED | OUTPATIENT
Start: 2021-05-01 | End: 2021-05-02 | Stop reason: HOSPADM

## 2021-05-01 RX ORDER — LIDOCAINE HYDROCHLORIDE 10 MG/ML
5 INJECTION, SOLUTION EPIDURAL; INFILTRATION; INTRACAUDAL; PERINEURAL ONCE
Status: DISCONTINUED | OUTPATIENT
Start: 2021-05-01 | End: 2021-05-02 | Stop reason: HOSPADM

## 2021-05-01 RX ORDER — SODIUM CHLORIDE 0.9 % (FLUSH) 0.9 %
10 SYRINGE (ML) INJECTION PRN
Status: DISCONTINUED | OUTPATIENT
Start: 2021-05-01 | End: 2021-05-02 | Stop reason: HOSPADM

## 2021-05-01 RX ORDER — ONDANSETRON 2 MG/ML
4 INJECTION INTRAMUSCULAR; INTRAVENOUS EVERY 6 HOURS PRN
Status: DISCONTINUED | OUTPATIENT
Start: 2021-05-01 | End: 2021-05-02 | Stop reason: HOSPADM

## 2021-05-01 RX ORDER — SODIUM CHLORIDE 9 MG/ML
1000 INJECTION, SOLUTION INTRAVENOUS CONTINUOUS
Status: DISCONTINUED | OUTPATIENT
Start: 2021-05-01 | End: 2021-05-02 | Stop reason: HOSPADM

## 2021-05-01 RX ADMIN — SODIUM CHLORIDE 1000 ML: 9 INJECTION, SOLUTION INTRAVENOUS at 18:52

## 2021-05-01 ASSESSMENT — ENCOUNTER SYMPTOMS
BACK PAIN: 0
NAUSEA: 1
VOMITING: 0
EYE PAIN: 0
ABDOMINAL PAIN: 1
SINUS PAIN: 0
DIARRHEA: 1
SORE THROAT: 0
SHORTNESS OF BREATH: 0
BLOOD IN STOOL: 0
CONSTIPATION: 1
COUGH: 0

## 2021-05-01 ASSESSMENT — PAIN SCALES - GENERAL: PAINLEVEL_OUTOF10: 0

## 2021-05-01 NOTE — ED NOTES
Patient with general diet  Requesting food  Provided with boxed lunch    No other needs at this time  RR even and non-labored  Call light remains within reach     Master Early RN  05/01/21 1952

## 2021-05-01 NOTE — ED PROVIDER NOTES
[facility] ED  Emergency Department Encounter  EmergencyMedicineResident     This patient was seen during the COVID-19 crisis. There were limited resources and those resources we did have had to be conserved for the sickest of patients. Pt Name: Areli Byrd  MRN: [id number]  [place] 1990  Date of evaluation: 5/1/21  PCP: Charan Hanson MD    CHIEF COMPLAINT       Chief Complaint   Patient presents with    Other     having issues with eating, sent by Dr. Herbert Bateman  (Location/Symptom, Timing/Onset, Context/Setting, Quality, Duration, Modifying Factors, Severity.)      Areli Byrd is a 26 y.o. female who presents for evaluation of mesenteric duodenal compression syndrome confirmed by CT abdomen pelvis with contrast on 4/14/2021. Patient's been following Dr. Enoc Murcia of bariatric surgery as an outpatient for unintentional weight loss. She has had extreme nausea and abdominal pain since she had her gallbladder removed in September 2020. She reports she had a 13 pound weight loss since that time. Moving quickly and eating seem to exacerbate her nausea. She was referred by Dr. Jasmyne Nunes to come to the emergency department today for admission and further evaluation. PAST MEDICAL / SURGICAL /SOCIAL / FAMILY HISTORY      has a past medical history of Abdominal bloating, Anxiety, Bacterial vaginitis, Chest pain, Colicky right upper quadrant pain, Dysmenorrhea, GERD (gastroesophageal reflux disease), H/O chlamydia infection, Nausea, Opiate addiction ([address] Osteoarthritis, and Pleurisy. has a past surgical history that includes laparoscopy; Tubal ligation; and Cholecystectomy.       Social History     Socioeconomic History    Marital status:      Spouse name: Not on file    Number of children: Not on file    Years of education: Not on file    Highest education level: Not on file   Occupational History    Not on file   Social Needs  Financial resource strain: Not on file    Food insecurity     Worry: Not on file     Inability: Not on file   [place] needs     Medical: Not on file     Non-medical: Not on file   Tobacco Use    Smoking status: Current Every Day Smoker    Smokeless tobacco: Never Used   Substance and Sexual Activity    Alcohol use: Never     Frequency: Never    Drug use: Never    Sexual activity: Not on file   Lifestyle    Physical activity     Days per week: Not on file     Minutes per session: Not on file    Stress: Not on file   Relationships    Social connections     Talks on phone: Not on file     Gets together: Not on file     Attends Cheondoism service: Not on file     Active member of club or organization: Not on file     Attends meetings of clubs or organizations: Not on file     Relationship status: Not on file    Intimate partner violence     Fear of current or ex partner: Not on file     Emotionally abused: Not on file     Physically abused: Not on file     Forced sexual activity: Not on file   Other Topics Concern    Not on file   Social History Narrative    Not on file       Family History   Problem Relation Age of Onset    High Blood Pressure Mother     High Cholesterol Mother     Other Mother         RA    Coronary Art Dis Father     High Blood Pressure Father     High Cholesterol Father     Cancer Other        Allergies:  Latex    Home Medications:  Prior to Admission medications    Medication Sig Start Date End Date Taking?  Authorizing Provider   cyclobenzaprine (FLEXERIL) 5 MG tablet TAKE 1 TABLET BY MOUTH AT BEDTIME 3/30/21   Historical Provider, MD   busPIRone (BUSPAR) 10 MG tablet Take 10 mg by mouth 2 times daily    Historical Provider, MD   pantoprazole (PROTONIX) 40 MG tablet Take 40 mg by mouth 2 times daily    Historical Provider, MD   ondansetron (ZOFRAN ODT) 4 MG disintegrating tablet Take 1 tablet by mouth every 8 hours as needed for Nausea 9/25/20   Shirin Montague, DO ketorolac (TORADOL) 10 MG tablet Take 1 tablet by mouth every 6 hours as needed for Pain 9/25/20 9/28/20  Ehsan Riggs DO   acetaminophen (TYLENOL) 650 MG suppository Place 650 mg rectally every 4 hours as needed for Fever    Historical Provider, MD   ibuprofen (ADVIL;MOTRIN) 800 MG tablet Take 800 mg by mouth every 8 hours as needed for Pain    Historical Provider, MD       REVIEW OF SYSTEMS    (2-9 systems for level 4, 10 or more forlevel 5)      Review of Systems   Constitutional: Positive for fatigue and unexpected weight change. Negative for activity change, chills and fever. HENT: Negative for congestion, sinus pain and sore throat. Eyes: Negative for pain and visual disturbance. Respiratory: Negative for cough and shortness of breath. Cardiovascular: Negative for chest pain. Gastrointestinal: Positive for abdominal pain, constipation, diarrhea and nausea. Negative for blood in stool and vomiting. Genitourinary: Negative for difficulty urinating, dysuria and hematuria. Musculoskeletal: Negative for back pain and myalgias. Skin: Negative for rash and wound. Neurological: Negative for dizziness, light-headedness and headaches. Psychiatric/Behavioral: Negative for agitation and confusion. PHYSICAL EXAM   (up to 7 for level 4, 8 or more forlevel 5)      ED TRIAGE VITALS BP: 118/74, Temp: 98.8 °F (37.1 °C), Pulse: 104, Resp: 16, SpO2: 96 %    Vitals:    05/01/21 1522 05/01/21 1538 05/01/21 1540   BP:   118/74   Pulse:  104    Resp:  16    Temp: 98.8 °F (37.1 °C)     TempSrc: Oral     SpO2:  96%          Physical Exam  Vitals signs and nursing note reviewed. Constitutional:       Appearance: She is ill-appearing and toxic-appearing. HENT:      Head: Normocephalic and atraumatic. Nose: Nose normal.      Mouth/Throat:      Mouth: Mucous membranes are moist.   Eyes:      Extraocular Movements: Extraocular movements intact.       Pupils: Pupils are equal, round, and reactive to light.   Neck:      Musculoskeletal: Normal range of motion. Cardiovascular:      Rate and Rhythm: Normal rate and regular rhythm. Pulses: Normal pulses. Heart sounds: Normal heart sounds. Pulmonary:      Effort: Pulmonary effort is normal.      Breath sounds: Normal breath sounds. Abdominal:      General: Abdomen is flat. Palpations: Abdomen is soft. Musculoskeletal: Normal range of motion. Skin:     General: Skin is warm and dry. Capillary Refill: Capillary refill takes less than 2 seconds. Neurological:      General: No focal deficit present. Mental Status: She is alert and oriented to person, place, and time. Psychiatric:         Mood and Affect: Mood normal.         Behavior: Behavior normal.           DIFFERENTIAL  DIAGNOSIS     PLAN (LABS / IMAGING / EKG):  Orders Placed This Encounter   Procedures    CBC Auto Differential    Comprehensive Metabolic Panel w/ Reflex to MG    HCG Qualitative, Serum    Lactate, Sepsis    BETA-HYDROXYBUTYRATE    Inpatient Consult to [address] [place] [address] consult to Hospitalist       MEDICATIONS ORDERED:  ED Medication Orders (From admission, onward)    Start Ordered     Status Ordering Provider    05/01/21 1715 05/01/21 1707  0.9 % sodium chloride infusion  CONTINUOUS      Acknowledged LYNN AYALA          DDX: SMA syndrome    DIAGNOSTIC RESULTS / EMERGENCY DEPARTMENT COURSE / MDM     IMPRESSION & INITIAL PLAN:  58-year-old female presented emerged part today for evaluation superior mesenteric artery syndrome. Patient was sent in by her weight loss physician Dr. Eugene Lindquist. In his note is states that this patient should be hospitalized. She reports he is at 13 pounds of weight loss since her gallbladder was removed in September 2020. She has baseline nausea worsened by quick movements and food. Plan to consult Dr. Doreen Lucas his bariatric team, get basic labs and provide IV fluid resuscitation.     With Dr. Amy Sherman bariatric team he like patient admitted to the internal medicine service for IV fluid resuscitation and nutrition. I did speak with the Intermed service who will be admitting the patient.   Any questions regarding this admission should be referred to the bariatric surgery team.    LABS:  Results for orders placed or performed during the hospital encounter of 05/01/21   CBC Auto Differential   Result Value Ref Range    WBC 8.1 3.5 - 11.3 k/uL    RBC 4.68 3.95 - 5.11 m/uL    Hemoglobin 14.6 11.9 - 15.1 g/dL    Hematocrit 43.7 36.3 - 47.1 %    MCV 93.4 82.6 - 102.9 fL    MCH 31.2 25.2 - 33.5 pg    MCHC 33.4 28.4 - 34.8 g/dL    RDW 12.0 11.8 - 14.4 %    Platelets 508 812 - 281 k/uL    MPV 10.4 8.1 - 13.5 fL    NRBC Automated 0.0 0.0 per 100 WBC    Differential Type NOT REPORTED     Seg Neutrophils 64 36 - 65 %    Lymphocytes 24 24 - 43 %    Monocytes 10 3 - 12 %    Eosinophils % 1 1 - 4 %    Basophils 1 0 - 2 %    Immature Granulocytes 0 0 %    Segs Absolute 5.18 1.50 - 8.10 k/uL    Absolute Lymph # 1.92 1.10 - 3.70 k/uL    Absolute Mono # 0.77 0.10 - 1.20 k/uL    Absolute Eos # 0.10 0.00 - 0.44 k/uL    Basophils Absolute 0.06 0.00 - 0.20 k/uL    Absolute Immature Granulocyte <0.03 0.00 - 0.30 k/uL    WBC Morphology NOT REPORTED     RBC Morphology NOT REPORTED     Platelet Estimate NOT REPORTED    Comprehensive Metabolic Panel w/ Reflex to MG   Result Value Ref Range    Glucose 88 70 - 99 mg/dL    BUN 14 6 - 20 mg/dL    CREATININE 0.63 0.50 - 0.90 mg/dL    Bun/Cre Ratio NOT REPORTED 9 - 20    Calcium 9.0 8.6 - 10.4 mg/dL    Sodium 136 135 - 144 mmol/L    Potassium 3.7 3.7 - 5.3 mmol/L    Chloride 101 98 - 107 mmol/L    CO2 22 20 - 31 mmol/L    Anion Gap 13 9 - 17 mmol/L    Alkaline Phosphatase 52 35 - 104 U/L    ALT 15 5 - 33 U/L    AST 18 <32 U/L    Total Bilirubin 0.42 0.3 - 1.2 mg/dL    Total Protein 7.7 6.4 - 8.3 g/dL    Albumin 4.8 3.5 - 5.2 g/dL    Albumin/Globulin Ratio 1.7 1.0 - 2.5    GFR Non- >60 >60 mL/min    GFR African American >60 >60 mL/min    GFR Comment          GFR Staging NOT REPORTED    HCG Qualitative, Serum   Result Value Ref Range    hCG Qual NEGATIVE NEGATIVE   Lactate, Sepsis   Result Value Ref Range    Lactic Acid, Sepsis NOT REPORTED 0.5 - 1.9 mmol/L    Lactic Acid, Sepsis, Whole Blood 1.0 0.5 - 1.9 mmol/L   BETA-HYDROXYBUTYRATE   Result Value Ref Range    Beta-Hydroxybutyrate 0.16 0.02 - 0.27 mmol/L       RADIOLOGY:  No orders to display     CONSULTS:  IP CONSULT TO BARIATRICS  IP CONSULT TO HOSPITALIST    CRITICAL CARE:  See attending physician note    FINAL IMPRESSION      1. Superior mesenteric artery syndrome ([facility] [address]          DISPOSITION / PLAN     DISPOSITION Decision To Admit 05/01/2021 05:44:11 PM      PATIENT REFERRED TO:  No follow-up provider specified.     DISCHARGE MEDICATIONS:  New Prescriptions    No medications on file     Modified Medications    No medications on file        Jairo Denton MD  Emergency Medicine Resident    (Please note that portions of this note were completed with a voice recognition program.  Efforts were made to edit the dictations but occasionally words are mis-transcribed.)       Jairo Denton MD  Resident  05/01/21 7291

## 2021-05-01 NOTE — ED PROVIDER NOTES
[facility] ED     Emergency Department     Faculty Attestation    I performed a history and physical examination of the patient and discussed management with the resident. I reviewed the residents note and agree with the documented findings and plan of care. Any areas of disagreement are noted on the chart. I was personally present for the key portions of any procedures. I have documented in the chart those procedures where I was not present during the key portions. I have reviewed the emergency nurses triage note. I agree with the chief complaint, past medical history, past surgical history, allergies, medications, social and family history as documented unless otherwise noted below. For Physician Assistant/ Nurse Practitioner cases/documentation I have personally evaluated this patient and have completed at least one if not all key elements of the E/M (history, physical exam, and MDM). Additional findings are as noted. Patient sent here by surgeon, Dr. Yariel Holcomb, for possible admission for SMA syndrome. Patient says she has been having abdominal pain with decreased oral intake ever since she had her gallbladder surgery back in the fall. Patient has lost over 10 pounds in weight since that time and patient has a low BMI to begin with. Patient states that she has significant pain every time she tries to eat. She says she has been able to keep down fluids and is urinating normally. Patient says she does become dizzy and lightheaded at times as well but has never passed out. On exam, patient is lying comfortably in the bed. Lungs are clear to auscultation bilaterally. Heart sounds are mildly tachycardic but regular. Abdomen is soft with mild diffuse tenderness. No rebound or guarding is present. Will check labs and administer IV fluids. Will speak with Dr. Yariel Holcomb about admission and possible imaging.       Dian Mendoza MD  Attending Emergency  Physician              Kenya Vásquez, MD  05/01/21 [id number]

## 2021-05-01 NOTE — ED NOTES
Pt presents to the ED with c/o of n/v and anorexia. Pt states she had her gallbladder removed in 12/20 and since then she has had no appetite. Pt states she has had to force herself to eat food and nothing tastes good. Pt states she is sometimes able to drink ensures or she just won't eat at all. Pt denies other c/o.    Call Light Given, White board updated      Caridad Duverney, RN  05/01/21 [id number]

## 2021-05-01 NOTE — ED PROVIDER NOTES
[facility] ED  Emergency Department  Emergency Medicine Resident Sign-out     Care of Kassi Odonnell was assumed from Dr. Iam Harper and is being seen for Other (having issues with eating, sent by Dr. Vera Syed )  . The patient's initial evaluation and plan have been discussed with the prior provider who initially evaluated the patient. EMERGENCY DEPARTMENT COURSE / MEDICAL DECISION MAKING:       MEDICATIONS GIVEN:  Orders Placed This Encounter   Medications    0.9 % sodium chloride infusion       LABS / RADIOLOGY:     Labs Reviewed   CBC WITH AUTO DIFFERENTIAL   COMPREHENSIVE METABOLIC PANEL W/ REFLEX TO MG FOR LOW K   HCG, SERUM, QUALITATIVE   LACTATE, SEPSIS   BETA-HYDROXYBUTYRATE   LACTATE, SEPSIS       Ct Abdomen Pelvis W Iv Contrast Additional Contrast? Oral    Result Date: 4/14/2021  EXAMINATION: CT OF THE ABDOMEN AND PELVIS WITH CONTRAST 4/14/2021 1:47 pm TECHNIQUE: CT of the abdomen and pelvis was performed with the administration of intravenous contrast. Multiplanar reformatted images are provided for review. Dose modulation, iterative reconstruction, and/or weight based adjustment of the mA/kV was utilized to reduce the radiation dose to as low as reasonably achievable. COMPARISON: 09/25/2020 HISTORY: ORDERING SYSTEM PROVIDED HISTORY: Mesenteric duodenal compression syndrome ([facility] [address] TECHNOLOGIST PROVIDED HISTORY: weight loss SMA syndrome Reason for Exam: .weight loss, SMA syndrome FINDINGS: Lower Chest: The visualized lung bases are clear. Organs: Status post cholecystectomy. The liver, pancreas, spleen, adrenals and kidneys reveal no acute findings. 5 mm hypoattenuating liver lesion in the left hepatic lobe is again noted, likely a cyst. GI/Bowel: There is no bowel dilatation or wall thickening identified. Relative narrowing of the space between the see appearing mesenteric vessels in the aorta is noted where the duodenum passes as well as the left renal vein.  Pelvis: No acute findings. No pelvic venous enlargement. Peritoneum/Retroperitoneum: No free air or free fluid. The aorta is normal in caliber. The visceral branches are patent. No lymphadenopathy. Bones/Soft Tissues: No abnormality identified. 1.  No acute abnormality identified. 2.  Narrowing of the space between the superior mesenteric vessels and aorta, which appears to have compressive affects on the duodenum without evidence for bowel obstruction. There is also mild compressive affects on the left renal vein without gonadal vein enlargement. This apparent duodenal compression could be further investigated with a fluoroscopic upper GI exam if clinically appropriate. RECENT VITALS:     Temp: 98.8 °F (37.1 °C),  Pulse: 104, Resp: 16, BP: 118/74, SpO2: 96 %     This patient is a 26 y.o. Female with with a past medical history that includes superior mesenteric artery syndrome presenting the emergency department with complaints of unintentional weight loss, nausea, vomiting, abdominal pain. Decreased p.o. intake since cholecystectomy this past fall leading to weight loss. Work-up grossly unremarkable. Bariatric surgery team consulted. Admitted to medicine. OUTSTANDING TASKS / RECOMMENDATIONS:    1. Awaiting final admission     FINAL IMPRESSION:     1. Superior mesenteric artery syndrome (HCC)        DISPOSITION:         DISPOSITION:  []  Discharge   []  Transfer -    [x]  Admission -internal medicine   []  Against Medical Advice   []  Eloped   FOLLOW-UP: No follow-up provider specified.    DISCHARGE MEDICATIONS: New Prescriptions    No medications on file           Hanh Morel DO  Emergency Medicine Resident  [address] [place]  Resident  05/01/21 0291

## 2021-05-01 NOTE — CONSULTS
General Surgery:  Consult Note        PATIENT NAME: Barbara Chilel OF BIRTH: 1990    ADMISSION DATE: 5/1/2021  4:51 PM     Admitting Provider: Valery Huitron    Consulted Physician: Dr. Celeste Garrett DATE: 5/1/2021    Chief Complaint:  Abdominal pain  Consult Regarding:  SMA syndrome    HISTORY OF PRESENT ILLNESS:  The patient is a 26 y.o. female  who was admitted on 5/1/2021 with complaints of unintentional weight loss over the past 6 months, food aversion, and chronic abdominal pain after eating. Patient states that she has been having these issues for nearly a year and that no one can seem to find the answer. Patient states these symptoms seem worse after having a cholecystectomy. A recent CT scan was noted to have compression of the duodenum and the left renal vein but without evidence of gonadal dilation or gross gastric obstruction. Patient's labs are noted to be normal on check today. Patient denies recent CP, SOB, fevers, sweats, or chills. Patient denies family or personal history of esophageal, gastric, colon or rectal cancer. Patient denies anticoagulation use. Patient is a former smoker, denies ETOH, former heroin user.        Past Medical History:        Diagnosis Date    Abdominal bloating     Anxiety     Bacterial vaginitis     Chest pain     Colicky right upper quadrant pain     Dysmenorrhea     GERD (gastroesophageal reflux disease)     H/O chlamydia infection     Nausea     Opiate addiction (HCC)     Osteoarthritis     Pleurisy        Past Surgical History:        Procedure Laterality Date    CHOLECYSTECTOMY      LAPAROSCOPY      TUBAL LIGATION         Medications Prior to Admission:   Medications Prior to Admission: cyclobenzaprine (FLEXERIL) 5 MG tablet, TAKE 1 TABLET BY MOUTH AT BEDTIME  busPIRone (BUSPAR) 10 MG tablet, Take 10 mg by mouth 2 times daily  pantoprazole (PROTONIX) 40 MG tablet, Take 40 mg by mouth 2 times daily  ondansetron (ZOFRAN ODT) 4 MG Father     High Blood Pressure Father     High Cholesterol Father     Cancer Other        REVIEW OF SYSTEMS:    CONSTITUTIONAL: endorses 10lb weight loss since October 2021  HEENT: Denies rhinorrhea, dysphagia, odynphagia. CARDIOVASCULAR: Denies history of MI, recent chest pain. RESPIRATORY: Denies recent history of shortness of breath or history of PE. GASTROINTESTINAL: endorses hx nausea, emesis, food aversion  GENITOURINARY: Denies increased frequency or dysuria. HEMATOLOGIC/LYMPHATIC: Denies history of anemia or DVTs. ENDOCRINE: Denies history of thyroid problems or diabetes. NEURO: Denies history of CVA, TIA. Review of systems negative unless listed above. PHYSICAL EXAM:    VITALS:  /64   Pulse 65   Temp 97.9 °F (36.6 °C) (Oral)   Resp 18   Ht 5' 2\" (1.575 m)   Wt 93 lb 11.2 oz (42.5 kg)   LMP 04/01/2021   SpO2 96%   BMI 17.14 kg/m²   INTAKE/OUTPUT:   No intake or output data in the 24 hours ending 05/01/21 2339    CONSTITUTIONAL:  awake, alert, not distressed and thin  HEENT: Normocephalic/atraumatic, without obvious abnormality. NECK:  Supple, symmetrical, trachea midline   CARDIOVASCULAR: Regular rate and rhythm without murmurs. LUNGS: Clear to auscultation bilaterally without evidence of wheezing or tachypnea. ABDOMEN: soft, NTND, without guarding, rebound or peritoneal signs   MUSCULOSKELETAL: Muscle strength intact in all extremities bilaterally. NEUROLOGIC: Gross motor intact without focal weakness. SKIN: No cyanosis, rashes, or edema noted.   Orientation:   oriented to person, place, and time    IV Access:  PIV    CBC with Differential:    Lab Results   Component Value Date    WBC 8.1 05/01/2021    RBC 4.68 05/01/2021    RBC 4.87 09/21/2020    HGB 14.6 05/01/2021    HCT 43.7 05/01/2021     05/01/2021    MCV 93.4 05/01/2021    MCH 31.2 05/01/2021    MCHC 33.4 05/01/2021    RDW 12.0 05/01/2021    LYMPHOPCT 24 05/01/2021    MONOPCT 10 05/01/2021    BASOPCT 1 05/01/2021    MONOSABS 0.77 05/01/2021    LYMPHSABS 1.92 05/01/2021    EOSABS 0.10 05/01/2021    BASOSABS 0.06 05/01/2021    DIFFTYPE NOT REPORTED 05/01/2021     CMP:    Lab Results   Component Value Date     05/01/2021    K 3.7 05/01/2021     05/01/2021    CO2 22 05/01/2021    BUN 14 05/01/2021    CREATININE 0.63 05/01/2021    GFRAA >60 05/01/2021    LABGLOM >60 05/01/2021    GLUCOSE 88 05/01/2021    GLUCOSE 122 09/21/2020    PROT 7.7 05/01/2021    LABALBU 4.8 05/01/2021    CALCIUM 9.0 05/01/2021    BILITOT 0.42 05/01/2021    ALKPHOS 52 05/01/2021    AST 18 05/01/2021    ALT 15 05/01/2021       Pertinent Radiology:   No results found. ASSESSMENT:  Active Hospital Problems    Diagnosis Date Noted    Failure to thrive (0-17) [R62.51] 05/01/2021       Plan:  1. Patient with weight loss and food aversion, noted SMA compression of duodenum on CT scan, will need nutritional optimization prior to discussion of any operative intervention for her condition. 2. Recommend diet as tolerated  3. Add nutrition supplements QID  4. Monitor for electrolyte abnormalities  5. Recommend obtain prealbumin  6. Medical and symptom management per primary      Electronically signed by Sveta Fu DO  on 5/1/2021 at 11:39 PM     General Surgery Attending     I have seen and examined this patient with surgical team, including review of pertinent history and exam findings, labs, imaging, vitals. I have performed the key elements of the encounter. I agree with the assessment, plan, and orders as documented by the surgical resident and any pertinent changes or updates have been made. Stable, no obstructive symptoms history obtained. Tolerating diet. Imaging and labs reviewed. Symptoms started a few months ago, about 13-15 lb weight loss. Previous cholecystectomy. Possible future elective surgery bypass duodeum or release Ligament of Trietz, Dr. Ijeoma Vázquez has discussed some potential options for her.      Dr. Vines Ast

## 2021-05-02 VITALS
BODY MASS INDEX: 17.24 KG/M2 | OXYGEN SATURATION: 97 % | SYSTOLIC BLOOD PRESSURE: 91 MMHG | HEART RATE: 62 BPM | HEIGHT: 62 IN | RESPIRATION RATE: 16 BRPM | DIASTOLIC BLOOD PRESSURE: 57 MMHG | WEIGHT: 93.7 LBS | TEMPERATURE: 97.2 F

## 2021-05-02 PROBLEM — K55.1 SMAS (SUPERIOR MESENTERIC ARTERY SYNDROME) (HCC): Status: ACTIVE | Noted: 2021-05-02

## 2021-05-02 PROBLEM — E44.0 MODERATE MALNUTRITION (HCC): Status: ACTIVE | Noted: 2021-05-02

## 2021-05-02 PROBLEM — R63.0 ANOREXIA: Status: ACTIVE | Noted: 2021-05-02

## 2021-05-02 LAB
ANION GAP SERPL CALCULATED.3IONS-SCNC: 7 MMOL/L (ref 9–17)
BUN BLDV-MCNC: 10 MG/DL (ref 6–20)
BUN/CREAT BLD: ABNORMAL (ref 9–20)
CALCIUM SERPL-MCNC: 8.8 MG/DL (ref 8.6–10.4)
CHLORIDE BLD-SCNC: 109 MMOL/L (ref 98–107)
CO2: 21 MMOL/L (ref 20–31)
CREAT SERPL-MCNC: 0.6 MG/DL (ref 0.5–0.9)
GFR AFRICAN AMERICAN: >60 ML/MIN
GFR NON-AFRICAN AMERICAN: >60 ML/MIN
GFR SERPL CREATININE-BSD FRML MDRD: ABNORMAL ML/MIN/{1.73_M2}
GFR SERPL CREATININE-BSD FRML MDRD: ABNORMAL ML/MIN/{1.73_M2}
GLUCOSE BLD-MCNC: 94 MG/DL (ref 70–99)
GLUCOSE BLD-MCNC: 97 MG/DL (ref 65–105)
POTASSIUM SERPL-SCNC: 4.6 MMOL/L (ref 3.7–5.3)
PREALBUMIN: 20.3 MG/DL (ref 20–40)
SODIUM BLD-SCNC: 137 MMOL/L (ref 135–144)

## 2021-05-02 PROCEDURE — 82947 ASSAY GLUCOSE BLOOD QUANT: CPT

## 2021-05-02 PROCEDURE — 6360000002 HC RX W HCPCS: Performed by: INTERNAL MEDICINE

## 2021-05-02 PROCEDURE — 80048 BASIC METABOLIC PNL TOTAL CA: CPT

## 2021-05-02 PROCEDURE — G0378 HOSPITAL OBSERVATION PER HR: HCPCS

## 2021-05-02 PROCEDURE — 99217 PR OBSERVATION CARE DISCHARGE MANAGEMENT: CPT | Performed by: INTERNAL MEDICINE

## 2021-05-02 PROCEDURE — 96372 THER/PROPH/DIAG INJ SC/IM: CPT

## 2021-05-02 PROCEDURE — 84134 ASSAY OF PREALBUMIN: CPT

## 2021-05-02 PROCEDURE — 36415 COLL VENOUS BLD VENIPUNCTURE: CPT

## 2021-05-02 PROCEDURE — 2580000003 HC RX 258: Performed by: STUDENT IN AN ORGANIZED HEALTH CARE EDUCATION/TRAINING PROGRAM

## 2021-05-02 RX ORDER — CHOLECALCIFEROL (VITAMIN D3) 125 MCG
6000 CAPSULE ORAL
Qty: 90 TABLET | Refills: 3 | Status: SHIPPED | OUTPATIENT
Start: 2021-05-02

## 2021-05-02 RX ORDER — POLYETHYLENE GLYCOL 3350 17 G/17G
17 POWDER, FOR SOLUTION ORAL DAILY PRN
Qty: 527 G | Refills: 1 | Status: SHIPPED | OUTPATIENT
Start: 2021-05-02 | End: 2021-06-01

## 2021-05-02 RX ORDER — LIDOCAINE HYDROCHLORIDE 10 MG/ML
5 INJECTION, SOLUTION EPIDURAL; INFILTRATION; INTRACAUDAL; PERINEURAL ONCE
Qty: 5 ML | Refills: 0 | Status: SHIPPED | OUTPATIENT
Start: 2021-05-02 | End: 2021-05-02 | Stop reason: HOSPADM

## 2021-05-02 RX ORDER — CHOLECALCIFEROL (VITAMIN D3) 125 MCG
6000 CAPSULE ORAL
Status: DISCONTINUED | OUTPATIENT
Start: 2021-05-02 | End: 2021-05-02 | Stop reason: HOSPADM

## 2021-05-02 RX ADMIN — SODIUM CHLORIDE 1000 ML: 9 INJECTION, SOLUTION INTRAVENOUS at 04:44

## 2021-05-02 RX ADMIN — PANTOPRAZOLE SODIUM 40 MG: 40 TABLET, DELAYED RELEASE ORAL at 08:11

## 2021-05-02 RX ADMIN — ENOXAPARIN SODIUM 40 MG: 40 INJECTION SUBCUTANEOUS at 08:11

## 2021-05-02 RX ADMIN — BUSPIRONE HYDROCHLORIDE 10 MG: 10 TABLET ORAL at 08:11

## 2021-05-02 RX ADMIN — BUSPIRONE HYDROCHLORIDE 10 MG: 10 TABLET ORAL at 01:12

## 2021-05-02 RX ADMIN — PANTOPRAZOLE SODIUM 40 MG: 40 TABLET, DELAYED RELEASE ORAL at 01:12

## 2021-05-02 ASSESSMENT — ENCOUNTER SYMPTOMS
COUGH: 0
COLOR CHANGE: 0
EYES NEGATIVE: 1
VOMITING: 0
NAUSEA: 1
VOMITING: 1
SHORTNESS OF BREATH: 0
CONSTIPATION: 1
CHEST TIGHTNESS: 0
ABDOMINAL PAIN: 1

## 2021-05-02 NOTE — H&P
by Dr. Desire Chirinos in the office on 4/11; repeat imaging was ordered and per patient \"junk food diet \"was recommended. Despite patient following dietary recommendations, she continues to lose weight; current weight today is 93 pounds. Dr. Desire Chirinos recommended ER evaluation today with admission for nutrition. Presenting labs are without abnormalities. Patient is admitted to [facility] with bariatric surgery consult. OP CT ABD W:  1.  No acute abnormality identified.       2.  Narrowing of the space between the superior mesenteric vessels and aorta,   which appears to have compressive affects on the duodenum without evidence   for bowel obstruction. Merian Rouge is also mild compressive affects on the left   renal vein without gonadal vein enlargement.       This apparent duodenal compression could be further investigated with a   fluoroscopic upper GI exam if clinically appropriate. On my evaluation, patient is sitting up in the bed with her  at bedside. She is attempting to eat dinner. At the moment, she is unchanged from baseline and is hemodynamically stable. Past Medical History:     Past Medical History:   Diagnosis Date    Abdominal bloating     Anxiety     Bacterial vaginitis     Chest pain     Colicky right upper quadrant pain     Dysmenorrhea     GERD (gastroesophageal reflux disease)     H/O chlamydia infection     Nausea     Opiate addiction (HCC)     Osteoarthritis     Pleurisy         Past Surgical History:     Past Surgical History:   Procedure Laterality Date    CHOLECYSTECTOMY      LAPAROSCOPY      TUBAL LIGATION          Medications Prior to Admission:     Prior to Admission medications    Medication Sig Start Date End Date Taking?  Authorizing Provider   cyclobenzaprine (FLEXERIL) 5 MG tablet TAKE 1 TABLET BY MOUTH AT BEDTIME 3/30/21   Historical Provider, MD   busPIRone (BUSPAR) 10 MG tablet Take 10 mg by mouth 2 times daily    Historical Provider, MD   pantoprazole (PROTONIX) 40 MG tablet Take 40 mg by mouth 2 times daily    Historical Provider, MD   ondansetron (ZOFRAN ODT) 4 MG disintegrating tablet Take 1 tablet by mouth every 8 hours as needed for Nausea 9/25/20   Florencio Quiles DO   ketorolac (TORADOL) 10 MG tablet Take 1 tablet by mouth every 6 hours as needed for Pain 9/25/20 9/28/20  Florencio Quiles DO   acetaminophen (TYLENOL) 650 MG suppository Place 650 mg rectally every 4 hours as needed for Fever    Historical Provider, MD   ibuprofen (ADVIL;MOTRIN) 800 MG tablet Take 800 mg by mouth every 8 hours as needed for Pain    Historical Provider, MD        Allergies:     Latex    Social History:     Tobacco:    reports that she has been smoking. She has never used smokeless tobacco.  Alcohol:      reports no history of alcohol use. Drug Use:  reports no history of drug use. Family History:     Family History   Problem Relation Age of Onset    High Blood Pressure Mother     High Cholesterol Mother     Other Mother         RA    Coronary Art Dis Father     High Blood Pressure Father     High Cholesterol Father     Cancer Other        Review of Systems:     Positive and Negative as described in HPI. Review of Systems   Constitutional: Positive for appetite change. Negative for activity change and fever. HENT: Negative. Eyes: Negative. Respiratory: Negative for cough, chest tightness and shortness of breath. Cardiovascular: Negative for chest pain and palpitations. Gastrointestinal: Positive for abdominal pain, nausea and vomiting. Endocrine: Negative for polyphagia and polyuria. Genitourinary: Negative for decreased urine volume, difficulty urinating, frequency and hematuria. Musculoskeletal: Negative for arthralgias, myalgias, neck pain and neck stiffness. Skin: Negative for color change. Neurological: Negative for dizziness, weakness, light-headedness and headaches.    Psychiatric/Behavioral: Negative for agitation, behavioral problems and confusion. The patient is not nervous/anxious. Physical Exam:   /64   Pulse 65   Temp 97.9 °F (36.6 °C) (Oral)   Resp 18   Ht 5' 2\" (1.575 m)   Wt 93 lb 11.2 oz (42.5 kg)   LMP 2021   SpO2 96%   BMI 17.14 kg/m²   Temp (24hrs), Av.4 °F (36.9 °C), Min:97.9 °F (36.6 °C), Max:98.8 °F (37.1 °C)    No results for input(s): POCGLU in the last 72 hours. No intake or output data in the 24 hours ending 21 3388    Physical Exam  Vitals signs and nursing note reviewed. Constitutional:       General: She is not in acute distress. Appearance: She is not toxic-appearing or diaphoretic. Comments: Thin and malnourished   HENT:      Head: Normocephalic and atraumatic. Right Ear: External ear normal.      Left Ear: External ear normal.      Nose: Nose normal. No congestion or rhinorrhea. Mouth/Throat:      Mouth: Mucous membranes are dry. Pharynx: Oropharynx is clear. Eyes:      Extraocular Movements: Extraocular movements intact. Conjunctiva/sclera: Conjunctivae normal.      Pupils: Pupils are equal, round, and reactive to light. Neck:      Musculoskeletal: Normal range of motion and neck supple. No neck rigidity or muscular tenderness. Cardiovascular:      Rate and Rhythm: Normal rate and regular rhythm. Pulses: Normal pulses. Heart sounds: Normal heart sounds. No murmur. No friction rub. No gallop. Pulmonary:      Effort: Pulmonary effort is normal. No respiratory distress. Breath sounds: Normal breath sounds. No wheezing, rhonchi or rales. Abdominal:      General: Abdomen is flat. There is no distension. Palpations: Abdomen is soft. There is no mass. Tenderness: There is abdominal tenderness. Musculoskeletal: Normal range of motion. Right lower leg: No edema. Left lower leg: No edema. Skin:     General: Skin is warm and dry. Capillary Refill: Capillary refill takes less than 2 seconds.

## 2021-05-02 NOTE — DISCHARGE SUMMARY
[facility]    Office: [address], DO, Venecia Brandon, DO, Maira Bagley, DO, Shayna [facility] Blood, DO, Beto Banks MD, Geni Painting MD, Frederick Palm MD, Heavenly Corrales MD, Poonam Beltran MD, Rogerio Walker MD, Hillary Carlson MD, Gerald Rapp MD, Mbonu Enid Gilford, MD, Saba Shaw DO, Win Gonzalez MD, Aguilar Pat MD, Briana Knox, DO, Harrietta Goldberg, MD,  Michael Chester DO, Rebecca Ley MD, Christy Aguirre MD, [facility], [facility], [facility], CNP, Alem Kaur, CNP, Anitha Garcia, CNS, Billy Guzman, CNP, Sanjiv Hudson, CNP, Lucinda Trevino, CNP, Wendy Barriga, CNP, Lane Valero, CNP, Jennifer Ferrer PA-C, Emily Wade, [facility] [place] Indy Ch, CNP, Jovani Ni, CNP, Leah Mccrary, CNP, Mini Márquez, CNP, [place], [address]    Discharge Summary    Patient ID: Kirt Tabares  :  1990   MRN: [id number]     ACCOUNT:  [de-identified]   Patient's PCP: Leonor Singh MD  Admit Date: 2021   Discharge Date: 2021    Discharge Physician: Ivan Gonzalez DO     The patient was seen and examined on day of discharge and this discharge summary is in conjunction with any daily progress note from day of discharge. Active Discharge Diagnoses:     Primary Problem  Superior mesenteric artery syndrome [facility])      [facility]    Diagnosis Date Noted    BMI less than 19,adult [Z68.1] 2021    Moderate malnutrition ([facility] [E44.0] 2021    Anorexia [R63.0] 2021    Superior mesenteric artery syndrome ([facility] [K55.1] 2021    Failure to thrive in adult [R62.7] 2021         Hospital Course:     Brief History:  As documented in the medical record:   \"This is a 25-year-old female, established patient of Dr. Nakia Lundberg presents today with significant weight loss, chronic abdominal pain/nausea, and poor apetite.  Patient had cholecystectomy in September, 2020, and developed the above-mentioned symptoms 2 months post-op.  Symptoms have been ongoing since that time. Milli Gleason was evaluated by Dr. Tj Faustin in the office on 4/11; repeat imaging was ordered and per patient \"junk food diet \"was recommended.  Despite patient following dietary recommendations, she continues to lose weight; current weight today is 93 pounds.  Dr. Tj Faustin recommended ER evaluation today with admission for nutrition.  Presenting labs are without abnormalities.  Patient is admitted to [facility] with bariatric surgery consult. \"      The patient reports chronic problems since her cholecystectomy  Appetites been poor  Food tolerance diminished  Chronic epigastric pain  Chronic nausea  Prior work-up has been nondiagnostic  Upper GI series were negative  She estimates a 13 to 14 pound weight loss  The patient reports that she has always been \"skinny\"     CT scan revealed:  1.  No acute abnormality identified.       2.  Narrowing of the space between the superior mesenteric vessels and aorta,   which appears to have compressive affects on the duodenum without evidence   for bowel obstruction. Bekah Copper is also mild compressive affects on the left   renal vein without gonadal vein enlargement.       This apparent duodenal compression could be further investigated with a   fluoroscopic upper GI exam if clinically appropriate.      The patient was admitted  Her pain was managed  Antiemetics were ordered  Hydration was initiated  Nutritional supplementation was provided     Albumin4.8   Rjkfnreaoj43.3         The patient responded well to medical therapy  Discharge planning initiated      Discharge plan:     Diet as tolerated  Nutritional supplementation  Pain management  Antiemetics as needed  Bariatric surgical eval & f/u as scheduled   Will discharge when arrangements complete and ok with other services.   Follow-up with PCP in one week, Danny Salas MD  Notify PCP of discharge     No discharge procedures on file. Significant Diagnostic Studies:     Labs / Micro:  Labs:  Hematology:  Recent Labs     05/01/21  1720   WBC 8.1   RBC 4.68   HGB 14.6   HCT 43.7   MCV 93.4   MCH 31.2   MCHC 33.4   RDW 12.0      MPV 10.4     Chemistry:  Recent Labs     05/01/21  1720 05/02/21  0655    137   K 3.7 4.6    109*   CO2 22 21   GLUCOSE 88 94   BUN 14 10   CREATININE 0.63 0.60   ANIONGAP 13 7*   LABGLOM >60 >60   GFRAA >60 >60   CALCIUM 9.0 8.8     Recent Labs     05/01/21  1720 05/02/21  0731   PROT 7.7  --    LABALBU 4.8  --    AST 18  --    ALT 15  --    ALKPHOS 52  --    BILITOT 0.42  --    POCGLU  --  97         Radiology:    Ct Abdomen Pelvis W Iv Contrast Additional Contrast? Oral    Result Date: 4/14/2021  EXAMINATION: CT OF THE ABDOMEN AND PELVIS WITH CONTRAST 4/14/2021 1:47 pm TECHNIQUE: CT of the abdomen and pelvis was performed with the administration of intravenous contrast. Multiplanar reformatted images are provided for review. Dose modulation, iterative reconstruction, and/or weight based adjustment of the mA/kV was utilized to reduce the radiation dose to as low as reasonably achievable. COMPARISON: 09/25/2020 HISTORY: ORDERING SYSTEM PROVIDED HISTORY: Mesenteric duodenal compression syndrome ([facility] TECHNOLOGIST PROVIDED HISTORY: weight loss SMA syndrome Reason for Exam: .weight loss, SMA syndrome FINDINGS: Lower Chest: The visualized lung bases are clear. Organs: Status post cholecystectomy. The liver, pancreas, spleen, adrenals and kidneys reveal no acute findings. 5 mm hypoattenuating liver lesion in the left hepatic lobe is again noted, likely a cyst. GI/Bowel: There is no bowel dilatation or wall thickening identified. Relative narrowing of the space between the see appearing mesenteric vessels in the aorta is noted where the duodenum passes as well as the left renal vein. Pelvis: No acute findings. No pelvic venous enlargement.  Peritoneum/Retroperitoneum: No free air or tablet  Commonly known as: TORADOL           Where to Get Your Medications      These medications were sent to [facility], [address] [place] [address]. [address] [place], [address] [facility] [address]    Phone: [phone]   · lactase 3000 units tablet  · polyethylene glycol 17 g packet         Time Spent on discharge is  17 mins in patient examination, evaluation, counseling, medication reconciliation, discharge plan and follow up. Electronically signed by   Rashida Villegas DO  5/2/2021  6:40 PM      Thank you Dr. Nate Shipman MD for the opportunity to be involved in this patient's care.

## 2021-05-02 NOTE — PROGRESS NOTES
[facility]    Office: [address], DO, Chris Labs, DO, Katherin Delgadillo, DO, Aarti Edmondson Blood, DO, Jaquelin Gunter MD, Gilbert Chaves MD, Paul Romero MD, Laurel Lares MD, Irene Bergeron MD, Sabina Lemus MD, Desirae Castaneda MD, Hardik Chavez MD, Adriana Mabry MD, Rochelle Verma, DO, Maribel Alvarenga MD, Rachelle Jacinto MD, Josephine Pro, DO, Ana Paula Espinoza MD,  Bessy Mejia, DO, Aki Campos MD, Harrison Cristina MD, Brayan Zapata, [facility], CNP, Aric Weber, CNP, Deb Ling, CNS, Shital Oneil, CNP, Orlando Tran, CNP, Lazaro Rees, CNP, Fuad Saavedra, CNP, Gerardo Winter, CNP, Chidi Araiza PA-C, Leelee Patel, [facility], Aleida Winter, CNP, Ana Luisa Oms, CNP, Kia Gordillo, CNP, Elisa Yousif, CNP, Lane Vick, [facility]   [address]    Progress Note    5/2/2021    2:08 PM    Name:   Freddie Cormier  MRN:     [id number]     Acct:      [de-identified]   Room:   [address] [place] [address] Day:  0  Admit Date:  5/1/2021  4:51 PM    PCP:   Marlon Anderson MD  Code Status:  Full Code    Subjective:     C/C:   Chief Complaint   Patient presents with    Other     having issues with eating, sent by Dr. Esa Bond        Interval History Status:  Much improved  Doing better with diet  Has drank 2 bottles of Ensure  Hopes to go home today    Data Base Updates: Albumin4.8   Xjuakdoldp60.3     Wjfkxbo05       Brief History:     As documented in the medical record: \"This is a 20-year-old female, established patient of Dr. Varsha Hendrix, who presents today with significant weight loss, chronic abdominal pain/nausea, and poor apetite. Patient had cholecystectomy in September, 2020, and developed the above-mentioned symptoms 2 months post-op. Symptoms have been ongoing since that time. She was evaluated by Dr. Prabhakar Vila in the office on 4/11; repeat imaging was ordered and per patient \"junk food diet \"was recommended.   Despite patient following dietary recommendations, she continues to lose weight; current weight today is 93 pounds. Dr. Evette Helm recommended ER evaluation today with admission for nutrition. Presenting labs are without abnormalities. Patient is admitted to [facility] with bariatric surgery consult. \"     The patient reports chronic problems since her cholecystectomy  Appetites been poor  Food tolerance diminished  Chronic epigastric pain  Chronic nausea  Prior work-up has been nondiagnostic  Upper GI series were negative  She estimates a 13 to 14 pound weight loss  The patient reports that she has always been \"skinny\"    CT scan revealed:  1.  No acute abnormality identified.       2.  Narrowing of the space between the superior mesenteric vessels and aorta,   which appears to have compressive affects on the duodenum without evidence   for bowel obstruction. Carolyn Guise is also mild compressive affects on the left   renal vein without gonadal vein enlargement.       This apparent duodenal compression could be further investigated with a   fluoroscopic upper GI exam if clinically appropriate. The patient was admitted  Her pain was managed  Antiemetics were ordered  Hydration was initiated  Nutritional supplementation was provided    Albumin4.8   Vucvaanflg47.3       The patient responded well to medical therapy  Discharge planning initiated    Medications: Allergies:     Allergies   Allergen Reactions    Latex        Current Meds:   Scheduled Meds:    fat emulsion  250 mL Intravenous Daily    lactase  6,000 Units Oral TID WC    sodium chloride flush  5-40 mL Intravenous 2 times per day    enoxaparin  40 mg Subcutaneous Daily    lidocaine 1 % injection  5 mL Intradermal Once    sodium chloride flush  5-40 mL Intravenous 2 times per day    busPIRone  10 mg Oral BID    pantoprazole  40 mg Oral BID     Continuous Infusions:    sodium chloride Stopped (05/02/21 1142)    sodium chloride      PN-Adult 2-in-1 Peripheral Line (Standard)      sodium chloride       PRN Meds: sodium chloride flush, sodium chloride, promethazine **OR** ondansetron, polyethylene glycol, nicotine, sodium chloride flush, sodium chloride    Data:     Past Medical History:   has a past medical history of Abdominal bloating, Anxiety, Bacterial vaginitis, Chest pain, Colicky right upper quadrant pain, Dysmenorrhea, GERD (gastroesophageal reflux disease), H/O chlamydia infection, Nausea, Opiate addiction ([facility]), Osteoarthritis, Pleurisy, and SMAS (superior mesenteric artery syndrome) ([facility] Social History:   reports that she has been smoking. She has never used smokeless tobacco. She reports that she does not drink alcohol or use drugs. Family History:   Family History   Problem Relation Age of Onset    High Blood Pressure Mother     High Cholesterol Mother     Other Mother         RA    Coronary Art Dis Father     High Blood Pressure Father     High Cholesterol Father     Cancer Other        Review of Systems:     Review of Systems   Constitutional: Positive for unexpected weight change (13 to 14 pounds over the last year). Respiratory: Negative for cough and shortness of breath. Cardiovascular: Negative for chest pain and palpitations. Gastrointestinal: Positive for abdominal pain (Chronic epigastric pain), constipation (Reporting small, hard feces) and nausea (Chronic nausea). Negative for vomiting. Genitourinary: Negative for flank pain and hematuria. Physical Examination:        Physical Exam  Vitals signs reviewed. Constitutional:       General: She is not in acute distress. Appearance: She is not diaphoretic. HENT:      Head: Normocephalic. Nose: Nose normal.   Eyes:      General: No scleral icterus. Conjunctiva/sclera: Conjunctivae normal.   Neck:      Musculoskeletal: Neck supple. Trachea: No tracheal deviation. Cardiovascular:      Rate and Rhythm: Normal rate and regular rhythm.    Pulmonary: Effort: Pulmonary effort is normal. No respiratory distress. Breath sounds: Normal breath sounds. No wheezing or rales. Chest:      Chest wall: No tenderness. Abdominal:      General: Bowel sounds are normal. There is no distension. Palpations: Abdomen is soft. Tenderness: There is no abdominal tenderness. Musculoskeletal:         General: No tenderness. Comments: Decreased muscle mass   Skin:     General: Skin is warm and dry. Neurological:      Mental Status: She is alert. Vitals:  BP (!) 91/57   Pulse 62   Temp 97.2 °F (36.2 °C) (Oral)   Resp 16   Ht 5' 2\" (1.575 m)   Wt 93 lb 11.2 oz (42.5 kg)   LMP 2021   SpO2 97%   BMI 17.14 kg/m²   Temp (24hrs), Av °F (36.7 °C), Min:97.2 °F (36.2 °C), Max:98.8 °F (37.1 °C)    Recent Labs     21  0731   POCGLU 97       I/O (24Hr): No intake or output data in the 24 hours ending 21 1408    Labs:  Hematology:  Recent Labs     21  1720   WBC 8.1   RBC 4.68   HGB 14.6   HCT 43.7   MCV 93.4   MCH 31.2   MCHC 33.4   RDW 12.0      MPV 10.4     Chemistry:  Recent Labs     21  1720 21  0655    137   K 3.7 4.6    109*   CO2 22 21   GLUCOSE 88 94   BUN 14 10   CREATININE 0.63 0.60   ANIONGAP 13 7*   LABGLOM >60 >60   GFRAA >60 >60   CALCIUM 9.0 8.8     Recent Labs     21  1720 21  0731   PROT 7.7  --    LABALBU 4.8  --    AST 18  --    ALT 15  --    ALKPHOS 52  --    BILITOT 0.42  --    POCGLU  --  97     ABG:No results found for: POCPH, PHART, PH, POCPCO2, VZP2SXL, PCO2, POCPO2, PO2ART, PO2, POCHCO3, WDA7IUA, HCO3, NBEA, PBEA, BEART, BE, THGBART, THB, JJX6RLZ, RXLS3VPQ, X1XFIDKF, O2SAT, FIO2  No results found for: SPECIAL  No results found for: CULTURE    Radiology:  No results found.       Assessment:        Principal Problem:    SMAS (superior mesenteric artery syndrome) (HCC)  Active Problems:    Failure to thrive in adult    BMI less than 19,adult    Moderate malnutrition ([address]    Anorexia  Resolved Problems:    * No resolved hospital problems. *      Plan:        Discharge planning  Diet as tolerated  Nutritional supplementation  Pain management  Antiemetics as needed  Bariatric surgical eval & f/u as scheduled   Will discharge when arrangements complete and ok with other services.   Follow-up with PCP in one week, Angel Bowman MD  Notify PCP of discharge     IP CONSULT TO BARIATRICS  IP CONSULT TO HOSPITALIST  IP CONSULT TO DIETITIAN  IP CONSULT TO Farooq Werner DO  5/2/2021  2:08 PM

## 2021-05-02 NOTE — CONSULTS
Comprehensive Nutrition Assessment    Type and Reason for Visit:  Consult(PN Order and Management)    Nutrition Recommendations/Plan:   1. Continue Current General Diet + High Calorie ONS 4 times daily  2. Initiate PPN Standard @ 50 mL/hr continuous (1200 mL) 100 g dextrose, 42.5 g AA + 250 mL 20% IV Lipid (run over 12 hours) to provide 1010 kcals/day, 43 g/protein day   3. Will monitor electrolytes for possible refeeding    Nutrition Assessment:  Pt admitted d/t issues with eating per Dr. Camp and failure to thrive in adult. Pt reports usual BW of 106 lbs before cholecystectomy 9/20. After surgery, pt has experieneced frequent nausea and ~12% weight loss in 6 months (9/2020: 106 lbs: 5/1: 93 lbs). Pt only able to tolerate few bites of food at a time. Pt reports was recommended \"junk food diet. \" Explained importance of adequate nutrition to properly feed body for weight gain. Writer consulted for PN order and management. Peripheral access on right arm - PPN to start 5/2. Will monitor PPN adequacy/tolerance and modify as needed. Malnutrition Assessment:  Malnutrition Status:  Severe malnutrition    Context:  Chronic Illness     Findings of the 6 clinical characteristics of malnutrition:  Energy Intake:  7 - 75% or less estimated energy requirements for 1 month or longer  Weight Loss:  (12% wt loss in 6 months)     Body Fat Loss:  7 - Severe body fat loss(upon visual examination) Orbital   Muscle Mass Loss:  7 - Severe muscle mass loss Temples (temporalis), Clavicles (pectoralis & deltoids)(upon visual examination)  Fluid Accumulation:  No significant fluid accumulation     Strength:  Not Performed    Estimated Daily Nutrient Needs:  Energy (kcal):  2538-9482 kcals/day; Weight Used for Energy Requirements:  Current(30-35 kcal/kg)    Protein (g):  50-60 g/day protein; Weight Used for Protein Requirements:  Current(1.2-1.4 g/kg)        Fluid (ml/day):  1.2 L (or per MD);  Method Used for Fluid Requirements: ml/Kg      Nutrition Related Findings:         Wounds:  None       Current Nutrition Therapies:    DIET GENERAL;  PN-Adult 2-in-1 Peripheral Line (Standard)  Dietary Nutrition Supplements: Standard High Calorie Oral Supplement  Dietary Nutrition Supplements:  Current Parenteral Nutrition Orders:  · Type and Formula: 2-in-1 Standard   · Lipids: 250ml  · Duration:  24 hr  · Rate/Volume: 50 mL (1200 mL)  · Current PN Order Provides: 100 g dextrose, 42.5 g AA + 250 mL 20% IV Lipids to provide 1010 kcals, 43 g protein    Anthropometric Measures:  · Height: 5' 2\" (157.5 cm)  · Current Body Weight: 93 lb 11.2 oz (42.5 kg)    · Ideal Body Weight: 110 lbs  · BMI: 17.1  · BMI Categories: Underweight (BMI less than 18.5)       Nutrition Diagnosis:   · Inadequate oral intake related to (nausea) as evidenced by intake 0-25%, weight loss greater than or equal to 10% in 6 months      Nutrition Interventions:   Food and/or Nutrient Delivery:  Continue Current Diet, Continue Oral Nutrition Supplement, Start Parenteral Nutrititon  Nutrition Education/Counseling:  No recommendation at this time   Coordination of Nutrition Care:  Continue to monitor while inpatient    Goals:  Meet >75% of estimated nutrient needs       Nutrition Monitoring and Evaluation:   Behavioral-Environmental Outcomes:  None Identified   Food/Nutrient Intake Outcomes:  Food and Nutrient Intake, Supplement Intake, Parenteral Nutrition Intake/Tolerance  Physical Signs/Symptoms Outcomes:  Nutrition Focused Physical Findings, Biochemical Data, Skin, Weight, Nausea or Vomiting, GI Status     Discharge Planning:     Too soon to determine     Electronically signed by Navin Knowles, MS, RD, LD on 5/2/21 at 12:24 PM EDT    Contact: [address] [facility] Nain Zarate

## 2021-05-02 NOTE — PROGRESS NOTES
[facility]  [facility]  [address]  [facility] [address]  Dept: [phone]    4/29/2021    CC: Weight Loss    26year old female with significant weight loss and CT showing SMA syndrome. Her weight is stable since last visit, however, she has not gained weight and is now at 93 lbs. She continues to have nausea with meals and difficulty with any solid foods.       Review of Systems:  General  Negative for: [] Weight Change   [x] Fatigue   [x] Fevers & Chills    [] Appetite change [] Other:    Positive for: [x] Weight Change   [] Fatigue   [] Fevers & Chills    [] Appetite change [] Other:   Cardiac  Negative for: [x] Chest Pain   [x] Difficulty Breathing   [] Leg Cramps [x] Edema of Lower Extremeties    [] Left   [] Right      Positive for: [] Chest Pain   [] Difficulty Breathing   [] Leg Cramps [] Edema of Lower Extremeties    [] Left   [] Right   Pulmonary  Negative for: [x] Shortness of Breath [] Wheeze [x] Cough  [] Calf Pain     Positive for: [] Shortness of Breath [] Wheeze [] Cough  [] Calf Pain   Gastro-Intestinal Negative for: [] Heartburn   [] Reflux   [] Dysphagia   [] Melena   [] BRBPR  [x] Vomiting   [x] Abdominal Pain   [] Diarrhea     [] Constipation  [] Other:     Positive for: [] Heartburn   [] Reflux   [] Dysphagia   [] Melena   [] BRBPR  [] Vomiting   [] Abdominal Pain   [] Diarrhea     [] Constipation  [] Other:    Muskuloskeletal Negative for: [] Joint pain   [] Back pain   [] Knee Pain   [x] Muscle weakness [x] Hernia   [] Edema [] Other:     Positive for: [] Joint pain   [] Back pain   [] Knee Pain   [] Muscle weakness [] Hernia   [] Edema [] Other:    Neurologic Negative for: [x] Syncope   [x] Insomnia   [] Being treated for depression  [] Other:     Positive for: [] Syncope   [] Insomnia   [] Being treated for depression  [] Other:    Skin Negative for: [] Wound:   [] Open   [] Draining   [] Incisional     [x] Rash   [x] Hair Loss  [] Other: Positive for: [] Wound:   [] Open   [] Draining    [] Incisional  [] Rash   [] Hair Loss  [] Other:        Physical Exam:  BP 92/60 (Site: Left Upper Arm, Position: Sitting, Cuff Size: Small Adult)   Pulse 69   Ht 5' 2\" (1.575 m)   Wt 93 lb (42.2 kg)   LMP 04/01/2021   BMI 17.01 kg/m²   Constitutional:  Vital signs are normal. The patient appears well-developed and well-nourished. HEENT:   Head: Normocephalic. Atraumatic  Eyes: pupils are equal and reactive. No scleral icterus is present. Neck: No mass and no thyromegaly present. Cardiovascular: Normal rate, regular rhythm, S1 normal and S2 normal.  Radial pulses present   Pulmonary/Chest: Effort normal and breath sounds normal. No retractions  Abdominal: Soft. Normal appearance. There is no organomegaly. No tenderness. There is no rigidity, no rebound, no guarding and no Jennings's sign. Musculoskeletal:        Right lower leg: Normal. No tenderness and no edema. Left lower leg: Normal. No tenderness and no edema. Lymphadenopathy:     No cervical adenopathy, No Exrtemity Adenopathy. Neurological: The patient is alert and oriented. Moving all 4 extremities, sensation grossly intact bilateral  Skin: Skin is warm, dry and intact. Psychiatric: The patient has a normal mood and affect.  Speech is normal and behavior is normal. Judgment and thought content normal. Cognition and memory are normal.     Assessment:  SMA syndrome  Failure to Thrive    Plan:  Patient instructed to go to hospital given significant weight loss and failure to thrive  CT reviewed with patient, does appear to show SMA syndrome

## 2021-05-02 NOTE — PROGRESS NOTES
General Surgery:  Daily Progress Note                    PATIENT NAME: Scarlett Penaloza     TODAY'S DATE: 5/2/2021, 7:55 AM  CC:  I feel alright today    SUBJECTIVE:     Pt seen and examined at bedside. MICHAEL, VSS, afebrile, patient denies current N/V/D, CP, or SOB. Patient is tolerating her nutrition supplements very well, she has ordered breakfast and will attempt to eat as much as she can along with the supplements. Patient has been voiding spontaneously, passing flatus, no BM overnight. Not currently complaining of any pain. OBJECTIVE:   VITALS:  BP (!) 91/57   Pulse 62   Temp 97.2 °F (36.2 °C) (Oral)   Resp 16   Ht 5' 2\" (1.575 m)   Wt 93 lb 11.2 oz (42.5 kg)   LMP 04/01/2021   SpO2 97%   BMI 17.14 kg/m²      INTAKE/OUTPUT:    No intake or output data in the 24 hours ending 05/02/21 0755    PHYSICAL EXAM:  General Appearance: awake, alert, oriented, in no acute distress and appears older than stated age  HEENT:  Normocephalic, atraumatic, mucus membranes moist   Heart: Heart regular rate and rhythm  Lungs: equal chest rise and fall, no increased WOB, no audible stridor or wheeze  Abdomen:soft, thin, NTND, without guarding rebound or peritoneal signs    Extremities: No cyanosis, pitting edema, rashes noted. Skin: Skin color, texture, turgor normal. No rashes or lesions.     IV Access:  PIV      Data:  CBC with Differential:    Lab Results   Component Value Date    WBC 8.1 05/01/2021    RBC 4.68 05/01/2021    RBC 4.87 09/21/2020    HGB 14.6 05/01/2021    HCT 43.7 05/01/2021     05/01/2021    MCV 93.4 05/01/2021    MCH 31.2 05/01/2021    MCHC 33.4 05/01/2021    RDW 12.0 05/01/2021    LYMPHOPCT 24 05/01/2021    MONOPCT 10 05/01/2021    BASOPCT 1 05/01/2021    MONOSABS 0.77 05/01/2021    LYMPHSABS 1.92 05/01/2021    EOSABS 0.10 05/01/2021    BASOSABS 0.06 05/01/2021    DIFFTYPE NOT REPORTED 05/01/2021     CMP:    Lab Results   Component Value Date     05/01/2021    K 3.7 05/01/2021    CL 101 05/01/2021    CO2 22 05/01/2021    BUN 14 05/01/2021    CREATININE 0.63 05/01/2021    GFRAA >60 05/01/2021    LABGLOM >60 05/01/2021    GLUCOSE 88 05/01/2021    GLUCOSE 122 09/21/2020    PROT 7.7 05/01/2021    LABALBU 4.8 05/01/2021    CALCIUM 9.0 05/01/2021    BILITOT 0.42 05/01/2021    ALKPHOS 52 05/01/2021    AST 18 05/01/2021    ALT 15 05/01/2021       Radiology Review:  No results found. ASSESSMENT:  Active Hospital Problems    Diagnosis Date Noted    Failure to thrive in adult [R62.7] 05/01/2021       26 y.o. female with failure to thrive, unintentional weight loss, SMA syndrome    Plan:  1. Diet: general plus high calorie and high protein supplements, if the patient is unable to tolerate solid foods then will puree her general diet  2. Analgesia:  Per primary, please limit narcotics due to IVDA hx  3. DVT prophylaxis:  Lovenox  4. Activity as tolerated  5. Follow up AM labs and prealbumin levels  6. Will evaluate patient for the need for a PICC and parenteral nutrition on Monday, at this time is appears patient will likely tolerate liquid/pureed diet      Electronically signed by Angel Dutta DO  on 5/2/2021 at 7:55 AM     Attending: see my assessment and comments on consult note.    Dr. Rayray Nugent

## 2021-05-02 NOTE — PLAN OF CARE
Problem: Nutrition  Goal: Optimal nutrition therapy  Outcome: Ongoing  Note: Nutrition Problem #1: Inadequate oral intake  Intervention: Food and/or Nutrient Delivery: Continue Current Diet, Continue Oral Nutrition Supplement, Start Parenteral Nutrititon  Nutritional Goals: Meet >75% of estimated nutrient needs

## 2021-05-02 NOTE — ED NOTES
Pt resting on stretcher. NAD noted. RR even and nonlabored. Call light within reach. Will continue to monitor.        Mariella Salas RN  05/01/21 [id number]

## 2021-05-03 ENCOUNTER — TELEPHONE (OUTPATIENT)
Dept: BARIATRICS/WEIGHT MGMT | Age: 31
End: 2021-05-03

## 2021-05-03 NOTE — LETTER
[facility]  [address]  [address] [address] [place] [facility] [address]  Phone: [phone]  Fax: [address], [place]        May 3, 2021     Patient: Girish Crystal   YOB: 1990   Date of Visit: 5/3/2021       To Whom It May Concern: It is my medical opinion that Leland Chow will be off work from 5/3/21-5/7/21. She has a hospital follow up appointment with Dr Ricardo Echavarria on 5/7/21. She can return to work on 5/8/21. If you have any questions or concerns, please don't hesitate to call.     Sincerely,        Kinza Coon, DO

## 2021-05-03 NOTE — TELEPHONE ENCOUNTER
Pt. Called today wondering when they are able to return back to work  They where seen over the weekend for observation and are scheduled in the office for 4/7/21,  Please advise

## 2021-05-07 ENCOUNTER — OFFICE VISIT (OUTPATIENT)
Dept: BARIATRICS/WEIGHT MGMT | Age: 31
End: 2021-05-07
Payer: COMMERCIAL

## 2021-05-07 VITALS
BODY MASS INDEX: 17.41 KG/M2 | WEIGHT: 94.6 LBS | DIASTOLIC BLOOD PRESSURE: 70 MMHG | HEART RATE: 70 BPM | SYSTOLIC BLOOD PRESSURE: 117 MMHG | TEMPERATURE: 97.2 F | HEIGHT: 62 IN

## 2021-05-07 DIAGNOSIS — K55.1: Primary | ICD-10-CM

## 2021-05-07 PROCEDURE — 99212 OFFICE O/P EST SF 10 MIN: CPT | Performed by: SURGERY

## 2021-05-09 NOTE — PROGRESS NOTES
Draining   [] Incisional     [x] Rash   [x] Hair Loss  [] Other:     Positive for: [] Wound:   [] Open   [] Draining    [] Incisional  [] Rash   [] Hair Loss  [] Other:        Physical Exam:  /70   Pulse 70   Temp 97.2 °F (36.2 °C) (Temporal)   Ht 5' 2\" (1.575 m)   Wt 94 lb 9.6 oz (42.9 kg)   BMI 17.30 kg/m²   Constitutional:  Vital signs are normal. The patient appears well-developed and well-nourished. HEENT:   Head: Normocephalic. Atraumatic  Eyes: pupils are equal and reactive. No scleral icterus is present. Neck: No mass and no thyromegaly present. Cardiovascular: Normal rate, regular rhythm, S1 normal and S2 normal.  Radial pulses present   Pulmonary/Chest: Effort normal and breath sounds normal. No retractions  Abdominal: Soft. Normal appearance. There is no organomegaly. No tenderness. There is no rigidity, no rebound, no guarding and no Jennings's sign. Musculoskeletal:        Right lower leg: Normal. No tenderness and no edema. Left lower leg: Normal. No tenderness and no edema. Neurological: The patient is alert and oriented. Moving all 4 extremities, sensation grossly intact bilateral  Skin: Skin is warm, dry and intact. Psychiatric: The patient has a normal mood and affect.  Speech is normal and behavior is normal. Judgment and thought content normal. Cognition and memory are normal.     Assessment:  SMA syndrome  Failure to Thrive  Albumin stable, but clearly has SMA syndrome    Plan:  Referral to [facility] given SMA syndrome and need for tertiary center  All questions answered

## 2021-05-10 ENCOUNTER — TELEPHONE (OUTPATIENT)
Dept: BARIATRICS/WEIGHT MGMT | Age: 31
End: 2021-05-10

## 2021-05-10 DIAGNOSIS — K55.1: Primary | ICD-10-CM

## 2021-05-10 NOTE — TELEPHONE ENCOUNTER
Patient states she was to call back on Monday to find out where she was going to be referred: either [facility] or [facility]

## 2021-05-18 NOTE — TELEPHONE ENCOUNTER
Left message asking patient to return call so we can give her name and phone number of provider Dr Dangelo Morales referred.

## 2023-12-30 ENCOUNTER — HOSPITAL ENCOUNTER (OUTPATIENT)
Dept: MRI IMAGING | Age: 33
End: 2023-12-30
Attending: INTERNAL MEDICINE
Payer: COMMERCIAL

## 2023-12-30 DIAGNOSIS — M45.8 ANKYLOSING SPONDYLITIS OF SACRAL REGION (HCC): ICD-10-CM

## 2023-12-30 PROCEDURE — 72195 MRI PELVIS W/O DYE: CPT
